# Patient Record
Sex: MALE | Race: WHITE | Employment: OTHER | URBAN - METROPOLITAN AREA
[De-identification: names, ages, dates, MRNs, and addresses within clinical notes are randomized per-mention and may not be internally consistent; named-entity substitution may affect disease eponyms.]

---

## 2017-08-17 PROBLEM — M51.36 DDD (DEGENERATIVE DISC DISEASE), LUMBAR: Status: ACTIVE | Noted: 2017-08-17

## 2017-08-17 PROBLEM — M48.04 THORACIC STENOSIS: Status: ACTIVE | Noted: 2017-08-17

## 2017-08-17 PROBLEM — M48.061 LUMBAR STENOSIS: Status: ACTIVE | Noted: 2017-08-17

## 2017-08-17 PROBLEM — M54.16 LUMBAR RADICULITIS: Status: ACTIVE | Noted: 2017-08-17

## 2017-08-28 ENCOUNTER — HOSPITAL ENCOUNTER (OUTPATIENT)
Dept: GENERAL RADIOLOGY | Facility: HOSPITAL | Age: 67
Discharge: HOME | End: 2017-08-28
Attending: NURSE PRACTITIONER
Payer: MEDICARE

## 2017-08-28 ENCOUNTER — OFFICE VISIT (OUTPATIENT)
Dept: SURGERY | Facility: CLINIC | Age: 67
End: 2017-08-28

## 2017-08-28 VITALS — SYSTOLIC BLOOD PRESSURE: 160 MMHG | HEART RATE: 64 BPM | DIASTOLIC BLOOD PRESSURE: 90 MMHG

## 2017-08-28 DIAGNOSIS — M51.36 DDD (DEGENERATIVE DISC DISEASE), LUMBAR: ICD-10-CM

## 2017-08-28 DIAGNOSIS — M54.16 BILATERAL LUMBAR RADICULOPATHY: ICD-10-CM

## 2017-08-28 DIAGNOSIS — M48.061 LUMBAR STENOSIS: ICD-10-CM

## 2017-08-28 DIAGNOSIS — M47.14 THORACIC SPONDYLOSIS WITH CORD COMPRESSION: ICD-10-CM

## 2017-08-28 DIAGNOSIS — M51.34 DDD (DEGENERATIVE DISC DISEASE), THORACIC: ICD-10-CM

## 2017-08-28 DIAGNOSIS — M47.14 THORACIC SPONDYLOSIS WITH CORD COMPRESSION: Primary | ICD-10-CM

## 2017-08-28 PROCEDURE — 99204 OFFICE O/P NEW MOD 45 MIN: CPT | Performed by: NURSE PRACTITIONER

## 2017-08-28 PROCEDURE — 72114 X-RAY EXAM L-S SPINE BENDING: CPT | Performed by: NURSE PRACTITIONER

## 2017-08-28 PROCEDURE — 72072 X-RAY EXAM THORAC SPINE 3VWS: CPT | Performed by: NURSE PRACTITIONER

## 2017-08-28 RX ORDER — GABAPENTIN 100 MG/1
300 CAPSULE ORAL NIGHTLY
Qty: 90 CAPSULE | Refills: 0 | Status: SHIPPED | OUTPATIENT
Start: 2017-08-28 | End: 2017-09-12

## 2017-08-28 NOTE — PATIENT INSTRUCTIONS
Refill policies:    • Allow 2-3 business days for refills; controlled substances may take longer.   • Contact your pharmacy at least 5 days prior to running out of medication and have them send an electronic request or submit request through the [facility] have a procedure or additional testing performed. [place] [facility]) will contact your insurance carrier to obtain pre-certification or prior authorization.     Unfortunately, [facility] has seen an increase in denial of payment even though the p

## 2017-08-28 NOTE — H&P
NEUROSURGERY CLINIC VISIT      Yayo Nair  2/22/1950      Patient presents with:   Other: NP referred by Dr. Bard Sheikh for lumbar stenosis        HPI:   Yayo Nair is a 69year old male PM Smokeless tobacco: Never Used                      Alcohol use: No              Denies history of substance abuse or addiction.   No Known Allergies      PHYSICAL EXAM:  /90 (BP Location: Left arm, Patient Position: Sitting, Cuff Size: adult)   Pulse MRI lumbar spine multiple level spinal canal stenosis with multilevel degenerative changes. L2-3 severe facet arthropathy with severe left neural foraminal narrowing and mild to moderate central canal stenosis.   L3-4 diffuse disc bulge with moderate bilat [facility]  [address], [address], [address] [facility]  [phone]  8/28/2017

## 2017-08-28 NOTE — PROGRESS NOTES
Location of Pain:lower back pain,  thighs, bottoms of feet    Date Pain Began: back pain for months, numbness and tingling in legs for 6 weeks          Work Related:   No        Receiving Work Comp/Disability:   No    Numeric Rating Scale:  Pain at Present

## 2017-08-31 ENCOUNTER — TELEPHONE (OUTPATIENT)
Dept: SURGERY | Facility: CLINIC | Age: 67
End: 2017-08-31

## 2017-08-31 NOTE — TELEPHONE ENCOUNTER
Pt presented to appt on 08/31 @ 2pm. Pt did not have referral for this appt, pt was aware referral required since 08/29 when [facility] contacted him while putting LISSET together.  Pt tried to work with referring office to have referral placed in time, however refer

## 2017-09-05 ENCOUNTER — TELEPHONE (OUTPATIENT)
Dept: SURGERY | Facility: CLINIC | Age: 67
End: 2017-09-05

## 2017-09-05 NOTE — TELEPHONE ENCOUNTER
Spoke with patient who stated he was not able to make f/u appointment that was previously scheduled with  8/31/17 due to insurance issues. Patient is now rescheduled for 10/3/17.  Patient is looking for direction on activities he should or should

## 2017-09-12 ENCOUNTER — OFFICE VISIT (OUTPATIENT)
Dept: SURGERY | Facility: CLINIC | Age: 67
End: 2017-09-12

## 2017-09-12 VITALS — SYSTOLIC BLOOD PRESSURE: 150 MMHG | HEART RATE: 80 BPM | DIASTOLIC BLOOD PRESSURE: 80 MMHG

## 2017-09-12 DIAGNOSIS — M48.04 THORACIC STENOSIS: Primary | ICD-10-CM

## 2017-09-12 PROCEDURE — 99215 OFFICE O/P EST HI 40 MIN: CPT | Performed by: NEUROLOGICAL SURGERY

## 2017-09-12 RX ORDER — GABAPENTIN 100 MG/1
300 CAPSULE ORAL NIGHTLY
Qty: 180 CAPSULE | Refills: 1 | Status: SHIPPED | OUTPATIENT
Start: 2017-09-12 | End: 2017-09-29

## 2017-09-12 NOTE — PATIENT INSTRUCTIONS
Refill policies:    • Allow 2-3 business days for refills; controlled substances may take longer.   • Contact your pharmacy at least 5 days prior to running out of medication and have them send an electronic request or submit request through the [facility] have a procedure or additional testing performed. [facility]) will contact your insurance carrier to obtain pre-certification or prior authorization.     Unfortunately, [facility] has seen an increase in denial of payment even though the p

## 2017-09-12 NOTE — PROGRESS NOTES
Neurosurgery Clinic Visit  2017    Jeanne Merino PCP:  Hiral Cabezas MD    1950 MRN [id number]     HISTORY OF PRESENT ILLNESS:  Jeanne Merino is a(n) 69year old male here for f/u  C/o christine ant thigh burning sensations  Some weakness  B spent with patient:  45 Minutes

## 2017-09-12 NOTE — PROGRESS NOTES
Pt is here to follow up with provider and discuss imaging    Numbness and tingling in legs bilateral, pain 2/10 in mid back    Taking 300mg of gabapentin nightly before bed

## 2017-09-29 DIAGNOSIS — M48.04 THORACIC STENOSIS: ICD-10-CM

## 2017-09-29 RX ORDER — GABAPENTIN 100 MG/1
CAPSULE ORAL
Qty: 180 CAPSULE | Refills: 1 | Status: SHIPPED | OUTPATIENT
Start: 2017-09-29 | End: 2017-09-29

## 2017-09-29 NOTE — TELEPHONE ENCOUNTER
Medication: Gabapentin    Date of last refill: 9/12/17  [place]    Last office visit: 9/12/17  Due back to clinic per last office note:  3 weeks  Date next office visit scheduled:  10/5/17    Last OV note recommendat

## 2017-10-02 RX ORDER — GABAPENTIN 100 MG/1
CAPSULE ORAL
Qty: 450 CAPSULE | Refills: 0 | Status: SHIPPED | OUTPATIENT
Start: 2017-10-02 | End: 2018-03-21

## 2017-10-02 NOTE — TELEPHONE ENCOUNTER
Patient is requesting a 90day supply.        Medication: Gabapentin 100mg     Date of last refill: 9/29/17  [place]     Last office visit: 9/12/17  Due back to clinic per last office note:  3 weeks  Date next office visit

## 2017-10-04 ENCOUNTER — OFFICE VISIT (OUTPATIENT)
Dept: ELECTROPHYSIOLOGY | Facility: HOSPITAL | Age: 67
End: 2017-10-04
Attending: NEUROLOGICAL SURGERY
Payer: MEDICARE

## 2017-10-04 DIAGNOSIS — M48.04 THORACIC STENOSIS: ICD-10-CM

## 2017-10-05 ENCOUNTER — TELEPHONE (OUTPATIENT)
Dept: SURGERY | Facility: CLINIC | Age: 67
End: 2017-10-05

## 2017-10-05 ENCOUNTER — OFFICE VISIT (OUTPATIENT)
Dept: SURGERY | Facility: CLINIC | Age: 67
End: 2017-10-05

## 2017-10-05 VITALS
HEART RATE: 64 BPM | BODY MASS INDEX: 28.7 KG/M2 | SYSTOLIC BLOOD PRESSURE: 142 MMHG | RESPIRATION RATE: 16 BRPM | HEIGHT: 71 IN | DIASTOLIC BLOOD PRESSURE: 78 MMHG | WEIGHT: 205 LBS

## 2017-10-05 DIAGNOSIS — M54.16 LUMBAR RADICULITIS: Primary | ICD-10-CM

## 2017-10-05 PROCEDURE — 99213 OFFICE O/P EST LOW 20 MIN: CPT | Performed by: NEUROLOGICAL SURGERY

## 2017-10-05 NOTE — PATIENT INSTRUCTIONS
Refill policies:    • Allow 2-3 business days for refills; controlled substances may take longer.   • Contact your pharmacy at least 5 days prior to running out of medication and have them send an electronic request or submit request through the [facility] have a procedure or additional testing performed. [facility]) will contact your insurance carrier to obtain pre-certification or prior authorization.     Unfortunately, [facility] has seen an increase in denial of payment even though the p

## 2017-10-05 NOTE — PROCEDURES
[address]      PATIENT'S NAME: Toni Maycol   REFERRING PHYSICIAN: Dionte Batista M.D.    PATIENT ACCOUNT #: [de-identified] LOCATION: [facility]   MEDICAL RECORD #: [id number] DATE OF BIRTH: abnormal study.   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

## 2017-10-05 NOTE — PROGRESS NOTES
Neurosurgery Clinic Visit  10/5/2017    Floridalma Herrera PCP:  Abel Prieto MD    1950 MRN [id number]     HISTORY OF PRESENT ILLNESS:  Floridalma Herrera is a(n) 69year old male here for follow-up  Patient got his EMG  Patient has family discusse

## 2017-11-29 ENCOUNTER — TELEPHONE (OUTPATIENT)
Dept: SURGERY | Facility: CLINIC | Age: 67
End: 2017-11-29

## 2017-11-29 NOTE — TELEPHONE ENCOUNTER
Patient is scheduled for surgical discussion appointment on 12/7/17 with . Surgery will be discussed at that time and patient will scheduled after that visit.

## 2017-12-07 ENCOUNTER — TELEPHONE (OUTPATIENT)
Dept: SURGERY | Facility: CLINIC | Age: 67
End: 2017-12-07

## 2017-12-07 ENCOUNTER — OFFICE VISIT (OUTPATIENT)
Dept: SURGERY | Facility: CLINIC | Age: 67
End: 2017-12-07

## 2017-12-07 VITALS — HEART RATE: 78 BPM | SYSTOLIC BLOOD PRESSURE: 132 MMHG | DIASTOLIC BLOOD PRESSURE: 76 MMHG

## 2017-12-07 DIAGNOSIS — M48.04 THORACIC STENOSIS: Primary | ICD-10-CM

## 2017-12-07 DIAGNOSIS — M54.16 LUMBAR RADICULITIS: ICD-10-CM

## 2017-12-07 DIAGNOSIS — M51.36 DDD (DEGENERATIVE DISC DISEASE), LUMBAR: ICD-10-CM

## 2017-12-07 DIAGNOSIS — M47.14 THORACIC SPONDYLOSIS WITH CORD COMPRESSION: ICD-10-CM

## 2017-12-07 PROCEDURE — 99213 OFFICE O/P EST LOW 20 MIN: CPT | Performed by: NEUROLOGICAL SURGERY

## 2017-12-07 NOTE — PATIENT INSTRUCTIONS
Refill policies:    • Allow 2-3 business days for refills; controlled substances may take longer.   • Contact your pharmacy at least 5 days prior to running out of medication and have them send an electronic request or submit request through the [facility] have a procedure or additional testing performed. [facility]) will contact your insurance carrier to obtain pre-certification or prior authorization.     Unfortunately, [facility] has seen an increase in denial of payment even though the p

## 2017-12-07 NOTE — H&P
Neurosurgery Clinic Visit  2017    Yayo Korey PCP:  Nikita Gastelum MD    1950 MRN [id number]     HISTORY OF PRESENT ILLNESS:  Yayo Nair is a(n) 69year old male who is here for follow-up of thoracic stenosis.   He continues to [facility]

## 2017-12-07 NOTE — PROGRESS NOTES
Here to schedule, pain has been worse.   C/o low back pain with nerve pain in anterior thighs to bottoms of fee

## 2017-12-08 NOTE — TELEPHONE ENCOUNTER
Patient scheduled for  THORACIC 10 AND THORACIC 11 LAMINECTOMIES  on 1/3/2018 with     Pre-op instructions discussed with patient and surgical packet provided:    · You will need to contact the Pre-admission department at [phone] to schedul

## 2017-12-11 RX ORDER — MULTIVIT-MIN/IRON FUM/FOLIC AC 7.5 MG-4
1 TABLET ORAL DAILY
COMMUNITY

## 2017-12-17 DIAGNOSIS — M48.04 THORACIC STENOSIS: ICD-10-CM

## 2017-12-18 RX ORDER — GABAPENTIN 100 MG/1
CAPSULE ORAL
Qty: 180 CAPSULE | Refills: 0 | OUTPATIENT
Start: 2017-12-18

## 2017-12-19 ENCOUNTER — TELEPHONE (OUTPATIENT)
Dept: SURGERY | Facility: CLINIC | Age: 67
End: 2017-12-19

## 2017-12-21 PROCEDURE — 87081 CULTURE SCREEN ONLY: CPT | Performed by: INTERNAL MEDICINE

## 2018-01-02 ENCOUNTER — ANESTHESIA EVENT (OUTPATIENT)
Dept: SURGERY | Facility: HOSPITAL | Age: 68
DRG: 516 | End: 2018-01-02
Payer: MEDICARE

## 2018-01-03 ENCOUNTER — APPOINTMENT (OUTPATIENT)
Dept: GENERAL RADIOLOGY | Facility: HOSPITAL | Age: 68
DRG: 516 | End: 2018-01-03
Attending: NEUROLOGICAL SURGERY
Payer: MEDICARE

## 2018-01-03 ENCOUNTER — HOSPITAL ENCOUNTER (INPATIENT)
Facility: HOSPITAL | Age: 68
LOS: 1 days | Discharge: HOME | DRG: 516 | End: 2018-01-04
Attending: NEUROLOGICAL SURGERY | Admitting: NEUROLOGICAL SURGERY
Payer: MEDICARE

## 2018-01-03 ENCOUNTER — ANESTHESIA (OUTPATIENT)
Dept: SURGERY | Facility: HOSPITAL | Age: 68
DRG: 516 | End: 2018-01-03
Payer: MEDICARE

## 2018-01-03 ENCOUNTER — SURGERY (OUTPATIENT)
Age: 68
End: 2018-01-03

## 2018-01-03 DIAGNOSIS — M47.14 THORACIC SPONDYLOSIS WITH CORD COMPRESSION: ICD-10-CM

## 2018-01-03 DIAGNOSIS — M48.04 THORACIC STENOSIS: Primary | ICD-10-CM

## 2018-01-03 LAB
ERYTHROCYTE [DISTWIDTH] IN BLOOD BY AUTOMATED COUNT: 12.9 % (ref 11.5–16)
HBV SURFACE AG SERPL QL IA: NONREACTIVE
HCT VFR BLD AUTO: 41.4 % (ref 37–53)
HEPATITIS B SURFACE ANTIGEN INDEX: <0.1
HEPATITIS C VIRUS AB INTERPRETATION: NONREACTIVE
HGB BLD-MCNC: 14.1 G/DL (ref 13–17)
HIVS EXPOSURE ONLY: NONREACTIVE
MCH RBC QN AUTO: 32.1 PG (ref 27–33.2)
MCHC RBC AUTO-ENTMCNC: 34.1 G/DL (ref 31–37)
MCV RBC AUTO: 94.3 FL (ref 80–99)
PLATELET # BLD AUTO: 241 10(3)UL (ref 150–450)
RBC # BLD AUTO: 4.39 X10(6)UL (ref 3.8–5.8)
RED CELL DISTRIBUTION WIDTH-SD: 45.1 FL (ref 35.1–46.3)
WBC # BLD AUTO: 9.7 X10(3) UL (ref 4–13)

## 2018-01-03 PROCEDURE — 86703 HIV-1/HIV-2 1 RESULT ANTBDY: CPT | Performed by: NEUROLOGICAL SURGERY

## 2018-01-03 PROCEDURE — 85027 COMPLETE CBC AUTOMATED: CPT | Performed by: PHYSICIAN ASSISTANT

## 2018-01-03 PROCEDURE — 87340 HEPATITIS B SURFACE AG IA: CPT | Performed by: NEUROLOGICAL SURGERY

## 2018-01-03 PROCEDURE — 01N80ZZ RELEASE THORACIC NERVE, OPEN APPROACH: ICD-10-PCS | Performed by: NEUROLOGICAL SURGERY

## 2018-01-03 PROCEDURE — 86803 HEPATITIS C AB TEST: CPT | Performed by: NEUROLOGICAL SURGERY

## 2018-01-03 PROCEDURE — 76001 XR FLUOROSCOPE EXAM >1 HR EXTENSIVE (CPT=76001): CPT | Performed by: NEUROLOGICAL SURGERY

## 2018-01-03 RX ORDER — MIDAZOLAM HYDROCHLORIDE 1 MG/ML
1 INJECTION INTRAMUSCULAR; INTRAVENOUS EVERY 5 MIN PRN
Status: DISCONTINUED | OUTPATIENT
Start: 2018-01-03 | End: 2018-01-03 | Stop reason: HOSPADM

## 2018-01-03 RX ORDER — GABAPENTIN 100 MG/1
100 CAPSULE ORAL 3 TIMES DAILY
Status: DISCONTINUED | OUTPATIENT
Start: 2018-01-03 | End: 2018-01-04

## 2018-01-03 RX ORDER — MEPERIDINE HYDROCHLORIDE 25 MG/ML
12.5 INJECTION INTRAMUSCULAR; INTRAVENOUS; SUBCUTANEOUS AS NEEDED
Status: DISCONTINUED | OUTPATIENT
Start: 2018-01-03 | End: 2018-01-03 | Stop reason: HOSPADM

## 2018-01-03 RX ORDER — ONDANSETRON 2 MG/ML
4 INJECTION INTRAMUSCULAR; INTRAVENOUS EVERY 4 HOURS PRN
Status: ACTIVE | OUTPATIENT
Start: 2018-01-03 | End: 2018-01-04

## 2018-01-03 RX ORDER — POLYETHYLENE GLYCOL 3350 17 G/17G
17 POWDER, FOR SOLUTION ORAL DAILY PRN
Status: DISCONTINUED | OUTPATIENT
Start: 2018-01-03 | End: 2018-01-04

## 2018-01-03 RX ORDER — CEFAZOLIN SODIUM 1 G/3ML
INJECTION, POWDER, FOR SOLUTION INTRAMUSCULAR; INTRAVENOUS
Status: DISCONTINUED | OUTPATIENT
Start: 2018-01-03 | End: 2018-01-03 | Stop reason: HOSPADM

## 2018-01-03 RX ORDER — BISACODYL 10 MG
10 SUPPOSITORY, RECTAL RECTAL
Status: DISCONTINUED | OUTPATIENT
Start: 2018-01-03 | End: 2018-01-04

## 2018-01-03 RX ORDER — HYDROCODONE BITARTRATE AND ACETAMINOPHEN 5; 325 MG/1; MG/1
2 TABLET ORAL AS NEEDED
Status: DISCONTINUED | OUTPATIENT
Start: 2018-01-03 | End: 2018-01-03 | Stop reason: HOSPADM

## 2018-01-03 RX ORDER — HYDROMORPHONE HYDROCHLORIDE 1 MG/ML
0.4 INJECTION, SOLUTION INTRAMUSCULAR; INTRAVENOUS; SUBCUTANEOUS EVERY 5 MIN PRN
Status: DISCONTINUED | OUTPATIENT
Start: 2018-01-03 | End: 2018-01-03 | Stop reason: HOSPADM

## 2018-01-03 RX ORDER — HYDROMORPHONE HYDROCHLORIDE 1 MG/ML
INJECTION, SOLUTION INTRAMUSCULAR; INTRAVENOUS; SUBCUTANEOUS
Status: COMPLETED
Start: 2018-01-03 | End: 2018-01-03

## 2018-01-03 RX ORDER — DOCUSATE SODIUM 100 MG/1
100 CAPSULE, LIQUID FILLED ORAL 2 TIMES DAILY
Status: DISCONTINUED | OUTPATIENT
Start: 2018-01-03 | End: 2018-01-04

## 2018-01-03 RX ORDER — CEFAZOLIN SODIUM/WATER 2 G/20 ML
2 SYRINGE (ML) INTRAVENOUS EVERY 8 HOURS
Status: COMPLETED | OUTPATIENT
Start: 2018-01-03 | End: 2018-01-03

## 2018-01-03 RX ORDER — CYCLOBENZAPRINE HCL 10 MG
10 TABLET ORAL EVERY 8 HOURS PRN
Status: DISCONTINUED | OUTPATIENT
Start: 2018-01-03 | End: 2018-01-04

## 2018-01-03 RX ORDER — BUPIVACAINE HYDROCHLORIDE AND EPINEPHRINE 2.5; 5 MG/ML; UG/ML
INJECTION, SOLUTION EPIDURAL; INFILTRATION; INTRACAUDAL; PERINEURAL AS NEEDED
Status: DISCONTINUED | OUTPATIENT
Start: 2018-01-03 | End: 2018-01-03 | Stop reason: HOSPADM

## 2018-01-03 RX ORDER — CEFAZOLIN SODIUM/WATER 2 G/20 ML
SYRINGE (ML) INTRAVENOUS
Status: DISPENSED
Start: 2018-01-03 | End: 2018-01-03

## 2018-01-03 RX ORDER — HYDROCODONE BITARTRATE AND ACETAMINOPHEN 10; 325 MG/1; MG/1
1 TABLET ORAL EVERY 4 HOURS PRN
Status: DISCONTINUED | OUTPATIENT
Start: 2018-01-03 | End: 2018-01-04

## 2018-01-03 RX ORDER — BACITRACIN 50000 [USP'U]/1
INJECTION, POWDER, LYOPHILIZED, FOR SOLUTION INTRAMUSCULAR AS NEEDED
Status: DISCONTINUED | OUTPATIENT
Start: 2018-01-03 | End: 2018-01-03 | Stop reason: HOSPADM

## 2018-01-03 RX ORDER — HYDROCODONE BITARTRATE AND ACETAMINOPHEN 10; 325 MG/1; MG/1
2 TABLET ORAL EVERY 4 HOURS PRN
Status: DISCONTINUED | OUTPATIENT
Start: 2018-01-03 | End: 2018-01-04

## 2018-01-03 RX ORDER — SODIUM CHLORIDE AND POTASSIUM CHLORIDE .9; .15 G/100ML; G/100ML
SOLUTION INTRAVENOUS CONTINUOUS
Status: DISCONTINUED | OUTPATIENT
Start: 2018-01-03 | End: 2018-01-04

## 2018-01-03 RX ORDER — DIPHENHYDRAMINE HYDROCHLORIDE 50 MG/ML
25 INJECTION INTRAMUSCULAR; INTRAVENOUS EVERY 4 HOURS PRN
Status: DISCONTINUED | OUTPATIENT
Start: 2018-01-03 | End: 2018-01-04

## 2018-01-03 RX ORDER — ONDANSETRON 2 MG/ML
4 INJECTION INTRAMUSCULAR; INTRAVENOUS AS NEEDED
Status: DISCONTINUED | OUTPATIENT
Start: 2018-01-03 | End: 2018-01-03 | Stop reason: HOSPADM

## 2018-01-03 RX ORDER — HYDROMORPHONE HYDROCHLORIDE 1 MG/ML
0.8 INJECTION, SOLUTION INTRAMUSCULAR; INTRAVENOUS; SUBCUTANEOUS EVERY 2 HOUR PRN
Status: DISCONTINUED | OUTPATIENT
Start: 2018-01-03 | End: 2018-01-04

## 2018-01-03 RX ORDER — LISINOPRIL 5 MG/1
5 TABLET ORAL DAILY
Status: DISCONTINUED | OUTPATIENT
Start: 2018-01-03 | End: 2018-01-04

## 2018-01-03 RX ORDER — SODIUM PHOSPHATE, DIBASIC AND SODIUM PHOSPHATE, MONOBASIC 7; 19 G/133ML; G/133ML
1 ENEMA RECTAL ONCE AS NEEDED
Status: DISCONTINUED | OUTPATIENT
Start: 2018-01-03 | End: 2018-01-04

## 2018-01-03 RX ORDER — NALOXONE HYDROCHLORIDE 0.4 MG/ML
80 INJECTION, SOLUTION INTRAMUSCULAR; INTRAVENOUS; SUBCUTANEOUS AS NEEDED
Status: DISCONTINUED | OUTPATIENT
Start: 2018-01-03 | End: 2018-01-03 | Stop reason: HOSPADM

## 2018-01-03 RX ORDER — SODIUM CHLORIDE, SODIUM LACTATE, POTASSIUM CHLORIDE, CALCIUM CHLORIDE 600; 310; 30; 20 MG/100ML; MG/100ML; MG/100ML; MG/100ML
INJECTION, SOLUTION INTRAVENOUS CONTINUOUS
Status: DISCONTINUED | OUTPATIENT
Start: 2018-01-03 | End: 2018-01-03 | Stop reason: HOSPADM

## 2018-01-03 RX ORDER — SODIUM CHLORIDE, SODIUM LACTATE, POTASSIUM CHLORIDE, CALCIUM CHLORIDE 600; 310; 30; 20 MG/100ML; MG/100ML; MG/100ML; MG/100ML
INJECTION, SOLUTION INTRAVENOUS CONTINUOUS
Status: DISCONTINUED | OUTPATIENT
Start: 2018-01-03 | End: 2018-01-03

## 2018-01-03 RX ORDER — ACETAMINOPHEN 325 MG/1
650 TABLET ORAL EVERY 4 HOURS PRN
Status: DISCONTINUED | OUTPATIENT
Start: 2018-01-03 | End: 2018-01-04

## 2018-01-03 RX ORDER — HYDROMORPHONE HYDROCHLORIDE 1 MG/ML
0.4 INJECTION, SOLUTION INTRAMUSCULAR; INTRAVENOUS; SUBCUTANEOUS EVERY 2 HOUR PRN
Status: DISCONTINUED | OUTPATIENT
Start: 2018-01-03 | End: 2018-01-04

## 2018-01-03 RX ORDER — MULTIPLE VITAMINS W/ MINERALS TAB 9MG-400MCG
1 TAB ORAL DAILY
Status: DISCONTINUED | OUTPATIENT
Start: 2018-01-03 | End: 2018-01-04

## 2018-01-03 RX ORDER — HYDROMORPHONE HYDROCHLORIDE 1 MG/ML
0.2 INJECTION, SOLUTION INTRAMUSCULAR; INTRAVENOUS; SUBCUTANEOUS EVERY 2 HOUR PRN
Status: DISCONTINUED | OUTPATIENT
Start: 2018-01-03 | End: 2018-01-04

## 2018-01-03 RX ORDER — CEFAZOLIN SODIUM/WATER 2 G/20 ML
2 SYRINGE (ML) INTRAVENOUS ONCE
Status: DISCONTINUED | OUTPATIENT
Start: 2018-01-03 | End: 2018-01-04

## 2018-01-03 RX ORDER — DIPHENHYDRAMINE HCL 25 MG
25 CAPSULE ORAL EVERY 4 HOURS PRN
Status: DISCONTINUED | OUTPATIENT
Start: 2018-01-03 | End: 2018-01-04

## 2018-01-03 RX ORDER — HYDROCODONE BITARTRATE AND ACETAMINOPHEN 5; 325 MG/1; MG/1
1 TABLET ORAL AS NEEDED
Status: DISCONTINUED | OUTPATIENT
Start: 2018-01-03 | End: 2018-01-03 | Stop reason: HOSPADM

## 2018-01-03 NOTE — ANESTHESIA PREPROCEDURE EVALUATION
PRE-OP EVALUATION    Patient Name: Franco Pemberton    Pre-op Diagnosis: Thoracic stenosis [M48.04]    Procedure(s):  THORACIC 10 AND THORACIC 11 LAMINECTOMIES AND ALL INDICATED PROCEDURES    Surgeon(s) and Role:     * Yury Price MD - Primary    P status: Never Smoker    Smokeless tobacco: Never Used    Alcohol use No       Drug use: No     Available pre-op labs reviewed.     Lab Results  Component Value Date   WBC 7.52 12/26/2017   RBC 4.89 12/26/2017   HGB 15.7 12/26/2017   HCT 45.7 12/26/2017   [facility]

## 2018-01-03 NOTE — BRIEF OP NOTE
Pre-Operative Diagnosis: Thoracic stenosis [M48.04]     Post-Operative Diagnosis: Thoracic stenosis [M48.04]     Procedure Performed:   Procedure(s):  THORACIC 10 AND THORACIC 11 LAMINECTOMIES     Surgeon(s) and Role:     * Sathish Price MD - [facility]

## 2018-01-03 NOTE — H&P (VIEW-ONLY)
Neurosurgery Clinic Visit  2017    Santino Melo PCP:  Skylar Doherty MD    1950 MRN [id number]     HISTORY OF PRESENT ILLNESS:  Santino Melo is a(n) 69year old male who is here for follow-up of thoracic stenosis.   He continues to [facility]

## 2018-01-03 NOTE — ANESTHESIA POSTPROCEDURE EVALUATION
[address] Patient Status:  Surgery Admit   Age/Gender 69year old male MRN [id number]   [facility] SURGERY Attending Mook Newman MD   Hosp Day # 0 PCP Bigg Sotvall MD       Anesthesia Post-op Note    Proc

## 2018-01-03 NOTE — OPERATIVE REPORT
Operative Note    Patient Name: Funmilayo Mendez    Preoperative Diagnosis: Thoracic stenosis [M48.04]    Postoperative Diagnosis: same    Primary Surgeon: Amilcar Taylor    Assistant: Abbie Luna pa-c    Procedures: thoracic 10 and thoracic 11 vannesa

## 2018-01-04 VITALS
OXYGEN SATURATION: 98 % | TEMPERATURE: 98 F | WEIGHT: 215.38 LBS | DIASTOLIC BLOOD PRESSURE: 73 MMHG | RESPIRATION RATE: 18 BRPM | SYSTOLIC BLOOD PRESSURE: 152 MMHG | BODY MASS INDEX: 30.83 KG/M2 | HEART RATE: 62 BPM | HEIGHT: 70 IN

## 2018-01-04 LAB
ERYTHROCYTE [DISTWIDTH] IN BLOOD BY AUTOMATED COUNT: 13.2 % (ref 11.5–16)
HCT VFR BLD AUTO: 40.9 % (ref 37–53)
HGB BLD-MCNC: 13.6 G/DL (ref 13–17)
MCH RBC QN AUTO: 32.1 PG (ref 27–33.2)
MCHC RBC AUTO-ENTMCNC: 33.3 G/DL (ref 31–37)
MCV RBC AUTO: 96.5 FL (ref 80–99)
PLATELET # BLD AUTO: 254 10(3)UL (ref 150–450)
RBC # BLD AUTO: 4.24 X10(6)UL (ref 3.8–5.8)
RED CELL DISTRIBUTION WIDTH-SD: 46.9 FL (ref 35.1–46.3)
WBC # BLD AUTO: 13.3 X10(3) UL (ref 4–13)

## 2018-01-04 PROCEDURE — 97165 OT EVAL LOW COMPLEX 30 MIN: CPT

## 2018-01-04 PROCEDURE — 97161 PT EVAL LOW COMPLEX 20 MIN: CPT

## 2018-01-04 PROCEDURE — 97535 SELF CARE MNGMENT TRAINING: CPT

## 2018-01-04 PROCEDURE — 97116 GAIT TRAINING THERAPY: CPT

## 2018-01-04 PROCEDURE — 85027 COMPLETE CBC AUTOMATED: CPT | Performed by: PHYSICIAN ASSISTANT

## 2018-01-04 RX ORDER — HYDROCODONE BITARTRATE AND ACETAMINOPHEN 10; 325 MG/1; MG/1
1-2 TABLET ORAL EVERY 6 HOURS PRN
Qty: 60 TABLET | Refills: 0 | Status: SHIPPED | OUTPATIENT
Start: 2018-01-04 | End: 2018-01-11 | Stop reason: ALTCHOICE

## 2018-01-04 NOTE — PLAN OF CARE
PAIN - ADULT    • Verbalizes/displays adequate comfort level or patient's stated pain goal Progressing        Patient/Family Goals    • Patient/Family Short Term Goal Progressing        SAFETY ADULT - FALL    • Free from fall injury Progressing        Kiana

## 2018-01-04 NOTE — OCCUPATIONAL THERAPY NOTE
OCCUPATIONAL THERAPY QUICK EVALUATION - INPATIENT    Room Number: 594/888-S  Evaluation Date: 1/4/2018     Type of Evaluation: Quick Eval  Presenting Problem: s/p T10-11 lamis 1/3/18    Physician Order: IP Consult to Occupational Therapy  Reason for [place] [employer]  Fall Risk: Standard fall risk    WEIGHT BEARING RESTRICTION  Weight Bearing Restriction: None                PAIN ASSESSMENT  Rating: 3  Location: back  Management Techniques: Activity promotion; Body mechanics;Breathing techniques;Relaxation;Reposit has counter in same location at home). Patient required minimal cueing for hand placement during transfers throughout.  Patient also educated on OT role, safety, fall prevention, pain management, shower transfers (simulated Mod I), car transfers (simulated independently  Patient able to dress lower extremities: safely and independently  Patient/Caregiver able to demonstrate safety with ADLS: safely and independently

## 2018-01-04 NOTE — PHYSICAL THERAPY NOTE
PHYSICAL THERAPY QUICK EVALUATION - INPATIENT    Room Number: 338/045-Q  Evaluation Date: 1/4/2018  Presenting Problem: s/p T10/11 laminectomy 1/3/18  Physician Order: PT Eval and Treat    Problem List  Active Problems:    * No active hospital problems. chair with arms (e.g., wheelchair, bedside commode, etc.): None   -   Moving from lying on back to sitting on the side of the bed?: None   How much help from another person does the patient currently need. ..   -   Moving to and from a bed to a chair (inclu patient's clinical presentation is stable and overall evaluation complexity is considered low. PT Discharge Recommendations: Home    PLAN  Patient has been evaluated and presents with no skilled Physical Therapy needs at this time.   Patient discharged fro

## 2018-01-04 NOTE — PROGRESS NOTES
[facility] Neurosurgery Progress Note    Shannon Brooks Patient Status:  Inpatient    1950 MRN [id number]   [facility] 3SW-A Attending Lindsey Anne MD   Hosp Day # 1 PCP Jaimee Mendoza MD     Subjective:  Sommer Shannon

## 2018-01-16 ENCOUNTER — OFFICE VISIT (OUTPATIENT)
Dept: SURGERY | Facility: CLINIC | Age: 68
End: 2018-01-16

## 2018-01-16 VITALS — DIASTOLIC BLOOD PRESSURE: 82 MMHG | RESPIRATION RATE: 18 BRPM | HEART RATE: 64 BPM | SYSTOLIC BLOOD PRESSURE: 146 MMHG

## 2018-01-16 DIAGNOSIS — M47.14 THORACIC SPONDYLOSIS WITH CORD COMPRESSION: Primary | ICD-10-CM

## 2018-01-16 PROCEDURE — 99024 POSTOP FOLLOW-UP VISIT: CPT | Performed by: NEUROLOGICAL SURGERY

## 2018-01-16 RX ORDER — DICLOFENAC SODIUM 75 MG/1
75 TABLET, DELAYED RELEASE ORAL 2 TIMES DAILY
Status: ON HOLD | COMMUNITY
End: 2018-03-29

## 2018-01-16 NOTE — PROGRESS NOTES
Neurosurgery Clinic Visit  2018    [place] PCP:  Tino Walker MD    1950 MRN [id number]     HISTORY OF PRESENT ILLNESS:  Lamont Prescott is a(n) 69year old male s/p thoracic laminectomy  Doing well  Improved function  Legs feel b

## 2018-01-16 NOTE — PROGRESS NOTES
Patient f/u post-op. Surgery 1/3/18. Doing well still having numbness to both thighs and bottom of feet. Walking is much better.

## 2018-01-16 NOTE — PATIENT INSTRUCTIONS
Refill policies:    • Allow 2-3 business days for refills; controlled substances may take longer.   • Contact your pharmacy at least 5 days prior to running out of medication and have them send an electronic request or submit request through the [facility] recommended that you have a procedure or additional testing performed. [facility]) will contact your insurance carrier to obtain pre-certification or prior authorization.     Unfortunately, [facility] has seen an increase in denial of paym

## 2018-01-18 PROBLEM — M54.16 LUMBAR RADICULITIS: Status: RESOLVED | Noted: 2017-08-17 | Resolved: 2018-01-18

## 2018-01-18 PROBLEM — M48.04 THORACIC STENOSIS: Status: RESOLVED | Noted: 2017-08-17 | Resolved: 2018-01-18

## 2018-01-18 PROBLEM — M48.061 LUMBAR STENOSIS: Status: RESOLVED | Noted: 2017-08-17 | Resolved: 2018-01-18

## 2018-01-18 PROBLEM — Z98.890 S/P LAMINECTOMY: Status: ACTIVE | Noted: 2018-01-18

## 2018-01-18 PROBLEM — M51.36 DDD (DEGENERATIVE DISC DISEASE), LUMBAR: Status: RESOLVED | Noted: 2017-08-17 | Resolved: 2018-01-18

## 2018-02-20 ENCOUNTER — OFFICE VISIT (OUTPATIENT)
Dept: SURGERY | Facility: CLINIC | Age: 68
End: 2018-02-20

## 2018-02-20 VITALS — HEART RATE: 60 BPM | SYSTOLIC BLOOD PRESSURE: 140 MMHG | DIASTOLIC BLOOD PRESSURE: 80 MMHG

## 2018-02-20 DIAGNOSIS — M47.14 THORACIC SPONDYLOSIS WITH CORD COMPRESSION: Primary | ICD-10-CM

## 2018-02-20 DIAGNOSIS — M47.14 THORACIC MYELOPATHY: ICD-10-CM

## 2018-02-20 PROCEDURE — 99024 POSTOP FOLLOW-UP VISIT: CPT | Performed by: NEUROLOGICAL SURGERY

## 2018-02-20 NOTE — PROGRESS NOTES
Neurosurgery Clinic Visit  2018    Reji Webergerardo PCP:  Mireya Pate MD    1950 MRN [id number]     HISTORY OF PRESENT ILLNESS:  Reji Winslow is a(n) 69year old male here for follow-up status post thoracic laminectomy  His legs feel m

## 2018-02-20 NOTE — PATIENT INSTRUCTIONS
Refill policies:    • Allow 2-3 business days for refills; controlled substances may take longer.   • Contact your pharmacy at least 5 days prior to running out of medication and have them send an electronic request or submit request through the [facility] recommended that you have a procedure or additional testing performed. [facility]) will contact your insurance carrier to obtain pre-certification or prior authorization.     Unfortunately, [facility] has seen an increase in denial of paym

## 2018-03-19 ENCOUNTER — LABORATORY ENCOUNTER (OUTPATIENT)
Dept: LAB | Facility: HOSPITAL | Age: 68
End: 2018-03-19
Attending: ORTHOPAEDIC SURGERY
Payer: MEDICARE

## 2018-03-19 ENCOUNTER — HOSPITAL ENCOUNTER (OUTPATIENT)
Dept: PHYSICAL THERAPY | Facility: HOSPITAL | Age: 68
Discharge: HOME | End: 2018-03-19
Attending: ORTHOPAEDIC SURGERY
Payer: MEDICARE

## 2018-03-19 DIAGNOSIS — M17.12 PRIMARY OSTEOARTHRITIS OF LEFT KNEE: ICD-10-CM

## 2018-03-19 DIAGNOSIS — Z12.5 SCREENING FOR MALIGNANT NEOPLASM OF PROSTATE: ICD-10-CM

## 2018-03-19 DIAGNOSIS — I10 ESSENTIAL HYPERTENSION: ICD-10-CM

## 2018-03-19 DIAGNOSIS — Z01.818 PREOP EXAMINATION: ICD-10-CM

## 2018-03-19 LAB
ALBUMIN SERPL-MCNC: 3.9 G/DL (ref 3.5–4.8)
ALP LIVER SERPL-CCNC: 94 U/L (ref 45–117)
ALT SERPL-CCNC: 25 U/L (ref 17–63)
ANTIBODY SCREEN: NEGATIVE
AST SERPL-CCNC: 22 U/L (ref 15–41)
BASOPHILS # BLD AUTO: 0.03 X10(3) UL (ref 0–0.1)
BASOPHILS NFR BLD AUTO: 0.5 %
BILIRUB SERPL-MCNC: 0.5 MG/DL (ref 0.1–2)
BUN BLD-MCNC: 26 MG/DL (ref 8–20)
CALCIUM BLD-MCNC: 9 MG/DL (ref 8.3–10.3)
CHLORIDE: 106 MMOL/L (ref 101–111)
CHOLEST SMN-MCNC: 191 MG/DL (ref ?–200)
CO2: 25 MMOL/L (ref 22–32)
CREAT BLD-MCNC: 1.31 MG/DL (ref 0.7–1.3)
EOSINOPHIL # BLD AUTO: 0.19 X10(3) UL (ref 0–0.3)
EOSINOPHIL NFR BLD AUTO: 3.3 %
ERYTHROCYTE [DISTWIDTH] IN BLOOD BY AUTOMATED COUNT: 13.1 % (ref 11.5–16)
GLUCOSE BLD-MCNC: 81 MG/DL (ref 70–99)
HCT VFR BLD AUTO: 40.6 % (ref 37–53)
HDLC SERPL-MCNC: 26 MG/DL (ref 45–?)
HDLC SERPL: 7.35 {RATIO} (ref ?–4.97)
HGB BLD-MCNC: 13.9 G/DL (ref 13–17)
IMMATURE GRANULOCYTE COUNT: 0.01 X10(3) UL (ref 0–1)
IMMATURE GRANULOCYTE RATIO %: 0.2 %
LDLC SERPL CALC-MCNC: 123 MG/DL (ref ?–130)
LYMPHOCYTES # BLD AUTO: 2.34 X10(3) UL (ref 0.9–4)
LYMPHOCYTES NFR BLD AUTO: 41.1 %
M PROTEIN MFR SERPL ELPH: 7.2 G/DL (ref 6.1–8.3)
MCH RBC QN AUTO: 32.1 PG (ref 27–33.2)
MCHC RBC AUTO-ENTMCNC: 34.2 G/DL (ref 31–37)
MCV RBC AUTO: 93.8 FL (ref 80–99)
MONOCYTES # BLD AUTO: 0.35 X10(3) UL (ref 0.1–1)
MONOCYTES NFR BLD AUTO: 6.2 %
NEUTROPHIL ABS PRELIM: 2.77 X10 (3) UL (ref 1.3–6.7)
NEUTROPHILS # BLD AUTO: 2.77 X10(3) UL (ref 1.3–6.7)
NEUTROPHILS NFR BLD AUTO: 48.7 %
NONHDLC SERPL-MCNC: 165 MG/DL (ref ?–130)
PLATELET # BLD AUTO: 198 10(3)UL (ref 150–450)
POTASSIUM SERPL-SCNC: 4 MMOL/L (ref 3.6–5.1)
PSA SERPL-MCNC: 2.03 NG/ML (ref 0.01–4)
RBC # BLD AUTO: 4.33 X10(6)UL (ref 3.8–5.8)
RED CELL DISTRIBUTION WIDTH-SD: 45 FL (ref 35.1–46.3)
RH BLOOD TYPE: POSITIVE
SODIUM SERPL-SCNC: 139 MMOL/L (ref 136–144)
TRIGL SERPL-MCNC: 210 MG/DL (ref ?–150)
VLDLC SERPL CALC-MCNC: 42 MG/DL (ref 5–40)
WBC # BLD AUTO: 5.7 X10(3) UL (ref 4–13)

## 2018-03-19 PROCEDURE — 84153 ASSAY OF PSA TOTAL: CPT

## 2018-03-19 PROCEDURE — 36415 COLL VENOUS BLD VENIPUNCTURE: CPT

## 2018-03-19 PROCEDURE — 86901 BLOOD TYPING SEROLOGIC RH(D): CPT

## 2018-03-19 PROCEDURE — 85025 COMPLETE CBC W/AUTO DIFF WBC: CPT

## 2018-03-19 PROCEDURE — 80061 LIPID PANEL: CPT

## 2018-03-19 PROCEDURE — 87081 CULTURE SCREEN ONLY: CPT

## 2018-03-19 PROCEDURE — 80053 COMPREHEN METABOLIC PANEL: CPT

## 2018-03-19 PROCEDURE — 86900 BLOOD TYPING SEROLOGIC ABO: CPT

## 2018-03-19 PROCEDURE — 86850 RBC ANTIBODY SCREEN: CPT

## 2018-03-21 DIAGNOSIS — M48.04 THORACIC STENOSIS: ICD-10-CM

## 2018-03-21 PROBLEM — Z96.652 STATUS POST LEFT KNEE REPLACEMENT: Status: ACTIVE | Noted: 2018-03-21

## 2018-03-21 RX ORDER — GABAPENTIN 100 MG/1
CAPSULE ORAL
Qty: 450 CAPSULE | Refills: 0 | Status: ON HOLD | OUTPATIENT
Start: 2018-03-21 | End: 2018-03-27

## 2018-03-21 NOTE — TELEPHONE ENCOUNTER
Medication: Gabapentin    Date of last refill: 12/26/17 90 day supply  [place]    Last office visit: 2/20/18  Due back to clinic per last office note:   \"He will follow-up several weeks later and see how is doing\"

## 2018-03-23 NOTE — H&P
[facility]    PATIENT'S NAME: Jh Watson   ATTENDING PHYSICIAN: Brooks Freeman M.D.    PATIENT ACCOUNT#:   [de-identified]    LOCATION:  OR   [facility]  MEDICAL RECORD #:   [id number]       YOB: 1950  ADMISSION DATE:       03/27/2018    HI scenarios are stressed. Possibility of continued pain, stiffness, vascular compromise, DVT, infection, hemarthrosis, surgical failure, residual disability is understood. He is well counseled. He will go to surgery with informed consent.     Dictated By Az Boateng

## 2018-03-26 ENCOUNTER — ANESTHESIA EVENT (OUTPATIENT)
Dept: SURGERY | Facility: HOSPITAL | Age: 68
DRG: 470 | End: 2018-03-26
Payer: MEDICARE

## 2018-03-27 ENCOUNTER — ANESTHESIA (OUTPATIENT)
Dept: SURGERY | Facility: HOSPITAL | Age: 68
DRG: 470 | End: 2018-03-27
Payer: MEDICARE

## 2018-03-27 ENCOUNTER — APPOINTMENT (OUTPATIENT)
Dept: GENERAL RADIOLOGY | Facility: HOSPITAL | Age: 68
DRG: 470 | End: 2018-03-27
Attending: ORTHOPAEDIC SURGERY
Payer: MEDICARE

## 2018-03-27 ENCOUNTER — SURGERY (OUTPATIENT)
Age: 68
End: 2018-03-27

## 2018-03-27 ENCOUNTER — HOSPITAL ENCOUNTER (INPATIENT)
Facility: HOSPITAL | Age: 68
LOS: 4 days | Discharge: HOME HEALTH | DRG: 470 | End: 2018-03-31
Attending: ORTHOPAEDIC SURGERY | Admitting: ORTHOPAEDIC SURGERY
Payer: MEDICARE

## 2018-03-27 DIAGNOSIS — M17.12 PRIMARY OSTEOARTHRITIS OF LEFT KNEE: Primary | ICD-10-CM

## 2018-03-27 PROCEDURE — 0SRD0J9 REPLACEMENT OF LEFT KNEE JOINT WITH SYNTHETIC SUBSTITUTE, CEMENTED, OPEN APPROACH: ICD-10-PCS | Performed by: ORTHOPAEDIC SURGERY

## 2018-03-27 PROCEDURE — 3E0T3BZ INTRODUCTION OF ANESTHETIC AGENT INTO PERIPHERAL NERVES AND PLEXI, PERCUTANEOUS APPROACH: ICD-10-PCS | Performed by: ANESTHESIOLOGY

## 2018-03-27 PROCEDURE — 73560 X-RAY EXAM OF KNEE 1 OR 2: CPT | Performed by: ORTHOPAEDIC SURGERY

## 2018-03-27 DEVICE — PSN ALL POLY PAT PLY 38MM: Type: IMPLANTABLE DEVICE | Site: KNEE | Status: FUNCTIONAL

## 2018-03-27 DEVICE — KIT TISS CLOS TISSEEL 4ML: Type: IMPLANTABLE DEVICE | Site: KNEE | Status: FUNCTIONAL

## 2018-03-27 DEVICE — PSN TIB STM 5 DEG SZ G L: Type: IMPLANTABLE DEVICE | Site: KNEE | Status: FUNCTIONAL

## 2018-03-27 DEVICE — CEMENT BONE ZIM PALICOS R +G: Type: IMPLANTABLE DEVICE | Site: KNEE | Status: FUNCTIONAL

## 2018-03-27 RX ORDER — MEPERIDINE HYDROCHLORIDE 25 MG/ML
12.5 INJECTION INTRAMUSCULAR; INTRAVENOUS; SUBCUTANEOUS AS NEEDED
Status: DISCONTINUED | OUTPATIENT
Start: 2018-03-27 | End: 2018-03-27 | Stop reason: HOSPADM

## 2018-03-27 RX ORDER — MORPHINE SULFATE 4 MG/ML
2 INJECTION, SOLUTION INTRAMUSCULAR; INTRAVENOUS EVERY 2 HOUR PRN
Status: DISCONTINUED | OUTPATIENT
Start: 2018-03-27 | End: 2018-03-28

## 2018-03-27 RX ORDER — SODIUM PHOSPHATE, DIBASIC AND SODIUM PHOSPHATE, MONOBASIC 7; 19 G/133ML; G/133ML
1 ENEMA RECTAL ONCE AS NEEDED
Status: COMPLETED | OUTPATIENT
Start: 2018-03-27 | End: 2018-03-29

## 2018-03-27 RX ORDER — LISINOPRIL 5 MG/1
5 TABLET ORAL DAILY
COMMUNITY
End: 2018-11-19

## 2018-03-27 RX ORDER — SODIUM CHLORIDE, SODIUM LACTATE, POTASSIUM CHLORIDE, CALCIUM CHLORIDE 600; 310; 30; 20 MG/100ML; MG/100ML; MG/100ML; MG/100ML
INJECTION, SOLUTION INTRAVENOUS CONTINUOUS
Status: DISCONTINUED | OUTPATIENT
Start: 2018-03-27 | End: 2018-03-27 | Stop reason: HOSPADM

## 2018-03-27 RX ORDER — ONDANSETRON 2 MG/ML
4 INJECTION INTRAMUSCULAR; INTRAVENOUS AS NEEDED
Status: DISCONTINUED | OUTPATIENT
Start: 2018-03-27 | End: 2018-03-27 | Stop reason: HOSPADM

## 2018-03-27 RX ORDER — GABAPENTIN 300 MG/1
300 CAPSULE ORAL NIGHTLY
Status: DISCONTINUED | OUTPATIENT
Start: 2018-03-27 | End: 2018-03-31

## 2018-03-27 RX ORDER — LISINOPRIL 5 MG/1
5 TABLET ORAL DAILY
Status: DISCONTINUED | OUTPATIENT
Start: 2018-03-27 | End: 2018-03-30

## 2018-03-27 RX ORDER — GABAPENTIN 100 MG/1
100 CAPSULE ORAL EVERY MORNING
Status: DISCONTINUED | OUTPATIENT
Start: 2018-03-27 | End: 2018-03-31

## 2018-03-27 RX ORDER — OXYCODONE HCL 10 MG/1
10 TABLET, FILM COATED, EXTENDED RELEASE ORAL
Status: COMPLETED | OUTPATIENT
Start: 2018-03-27 | End: 2018-03-27

## 2018-03-27 RX ORDER — FERROUS SULFATE 325(65) MG
1 TABLET ORAL DAILY
Status: DISCONTINUED | OUTPATIENT
Start: 2018-03-27 | End: 2018-03-27

## 2018-03-27 RX ORDER — ACETAMINOPHEN 325 MG/1
650 TABLET ORAL 4 TIMES DAILY
Status: DISCONTINUED | OUTPATIENT
Start: 2018-03-27 | End: 2018-03-28

## 2018-03-27 RX ORDER — BISACODYL 10 MG
10 SUPPOSITORY, RECTAL RECTAL
Status: DISCONTINUED | OUTPATIENT
Start: 2018-03-27 | End: 2018-03-31

## 2018-03-27 RX ORDER — GABAPENTIN 100 MG/1
100 CAPSULE ORAL EVERY MORNING
COMMUNITY
End: 2018-04-09

## 2018-03-27 RX ORDER — TRAMADOL HYDROCHLORIDE 50 MG/1
50 TABLET ORAL EVERY 12 HOURS
Status: DISCONTINUED | OUTPATIENT
Start: 2018-03-27 | End: 2018-03-31

## 2018-03-27 RX ORDER — MORPHINE SULFATE 4 MG/ML
4 INJECTION, SOLUTION INTRAMUSCULAR; INTRAVENOUS EVERY 2 HOUR PRN
Status: DISCONTINUED | OUTPATIENT
Start: 2018-03-27 | End: 2018-03-27

## 2018-03-27 RX ORDER — POLYETHYLENE GLYCOL 3350 17 G/17G
17 POWDER, FOR SOLUTION ORAL DAILY PRN
Status: DISCONTINUED | OUTPATIENT
Start: 2018-03-27 | End: 2018-03-31

## 2018-03-27 RX ORDER — METOCLOPRAMIDE HYDROCHLORIDE 5 MG/ML
10 INJECTION INTRAMUSCULAR; INTRAVENOUS AS NEEDED
Status: DISCONTINUED | OUTPATIENT
Start: 2018-03-27 | End: 2018-03-27 | Stop reason: HOSPADM

## 2018-03-27 RX ORDER — DOCUSATE SODIUM 100 MG/1
100 CAPSULE, LIQUID FILLED ORAL 2 TIMES DAILY
Status: DISCONTINUED | OUTPATIENT
Start: 2018-03-27 | End: 2018-03-27

## 2018-03-27 RX ORDER — GABAPENTIN 100 MG/1
100 CAPSULE ORAL EVERY EVENING
Status: DISCONTINUED | OUTPATIENT
Start: 2018-03-27 | End: 2018-03-31

## 2018-03-27 RX ORDER — MORPHINE SULFATE 4 MG/ML
1.5 INJECTION, SOLUTION INTRAMUSCULAR; INTRAVENOUS EVERY 2 HOUR PRN
Status: DISCONTINUED | OUTPATIENT
Start: 2018-03-27 | End: 2018-03-27

## 2018-03-27 RX ORDER — MORPHINE SULFATE 4 MG/ML
6 INJECTION, SOLUTION INTRAMUSCULAR; INTRAVENOUS EVERY 2 HOUR PRN
Status: DISCONTINUED | OUTPATIENT
Start: 2018-03-27 | End: 2018-03-27

## 2018-03-27 RX ORDER — MORPHINE SULFATE 4 MG/ML
1 INJECTION, SOLUTION INTRAMUSCULAR; INTRAVENOUS EVERY 2 HOUR PRN
Status: DISCONTINUED | OUTPATIENT
Start: 2018-03-27 | End: 2018-03-27

## 2018-03-27 RX ORDER — MORPHINE SULFATE 4 MG/ML
2.5 INJECTION, SOLUTION INTRAMUSCULAR; INTRAVENOUS EVERY 2 HOUR PRN
Status: DISCONTINUED | OUTPATIENT
Start: 2018-03-27 | End: 2018-03-27

## 2018-03-27 RX ORDER — ACETAMINOPHEN 325 MG/1
TABLET ORAL
Status: COMPLETED
Start: 2018-03-27 | End: 2018-03-27

## 2018-03-27 RX ORDER — CEFAZOLIN SODIUM/WATER 2 G/20 ML
2 SYRINGE (ML) INTRAVENOUS ONCE
Status: COMPLETED | OUTPATIENT
Start: 2018-03-27 | End: 2018-03-27

## 2018-03-27 RX ORDER — LABETALOL HYDROCHLORIDE 5 MG/ML
5 INJECTION, SOLUTION INTRAVENOUS EVERY 5 MIN PRN
Status: DISCONTINUED | OUTPATIENT
Start: 2018-03-27 | End: 2018-03-27 | Stop reason: HOSPADM

## 2018-03-27 RX ORDER — METOCLOPRAMIDE HYDROCHLORIDE 5 MG/ML
10 INJECTION INTRAMUSCULAR; INTRAVENOUS EVERY 6 HOURS PRN
Status: DISPENSED | OUTPATIENT
Start: 2018-03-27 | End: 2018-03-29

## 2018-03-27 RX ORDER — SODIUM CHLORIDE 9 MG/ML
INJECTION, SOLUTION INTRAVENOUS CONTINUOUS
Status: DISCONTINUED | OUTPATIENT
Start: 2018-03-27 | End: 2018-03-31

## 2018-03-27 RX ORDER — KETOCONAZOLE 20 MG/G
CREAM TOPICAL DAILY
Status: DISCONTINUED | OUTPATIENT
Start: 2018-03-27 | End: 2018-03-31

## 2018-03-27 RX ORDER — MORPHINE SULFATE 4 MG/ML
1 INJECTION, SOLUTION INTRAMUSCULAR; INTRAVENOUS EVERY 2 HOUR PRN
Status: DISCONTINUED | OUTPATIENT
Start: 2018-03-27 | End: 2018-03-28

## 2018-03-27 RX ORDER — TIZANIDINE 2 MG/1
2 TABLET ORAL 3 TIMES DAILY PRN
Status: DISCONTINUED | OUTPATIENT
Start: 2018-03-27 | End: 2018-03-31

## 2018-03-27 RX ORDER — DIPHENHYDRAMINE HYDROCHLORIDE 50 MG/ML
12.5 INJECTION INTRAMUSCULAR; INTRAVENOUS EVERY 4 HOURS PRN
Status: DISCONTINUED | OUTPATIENT
Start: 2018-03-27 | End: 2018-03-28

## 2018-03-27 RX ORDER — MORPHINE SULFATE 4 MG/ML
2 INJECTION, SOLUTION INTRAMUSCULAR; INTRAVENOUS EVERY 5 MIN PRN
Status: DISCONTINUED | OUTPATIENT
Start: 2018-03-27 | End: 2018-03-27 | Stop reason: HOSPADM

## 2018-03-27 RX ORDER — DIPHENHYDRAMINE HCL 25 MG
25 CAPSULE ORAL EVERY 4 HOURS PRN
Status: DISCONTINUED | OUTPATIENT
Start: 2018-03-27 | End: 2018-03-28

## 2018-03-27 RX ORDER — NALOXONE HYDROCHLORIDE 0.4 MG/ML
80 INJECTION, SOLUTION INTRAMUSCULAR; INTRAVENOUS; SUBCUTANEOUS AS NEEDED
Status: DISCONTINUED | OUTPATIENT
Start: 2018-03-27 | End: 2018-03-27 | Stop reason: HOSPADM

## 2018-03-27 RX ORDER — OXYCODONE HYDROCHLORIDE 10 MG/1
10 TABLET ORAL EVERY 4 HOURS PRN
Status: DISCONTINUED | OUTPATIENT
Start: 2018-03-27 | End: 2018-03-28

## 2018-03-27 RX ORDER — OXYCODONE HYDROCHLORIDE 5 MG/1
2.5 TABLET ORAL EVERY 4 HOURS PRN
Status: DISCONTINUED | OUTPATIENT
Start: 2018-03-27 | End: 2018-03-28

## 2018-03-27 RX ORDER — MORPHINE SULFATE 4 MG/ML
5 INJECTION, SOLUTION INTRAMUSCULAR; INTRAVENOUS EVERY 2 HOUR PRN
Status: DISCONTINUED | OUTPATIENT
Start: 2018-03-27 | End: 2018-03-27

## 2018-03-27 RX ORDER — CEFAZOLIN SODIUM/WATER 2 G/20 ML
2 SYRINGE (ML) INTRAVENOUS EVERY 8 HOURS
Status: COMPLETED | OUTPATIENT
Start: 2018-03-27 | End: 2018-03-28

## 2018-03-27 RX ORDER — MORPHINE SULFATE 4 MG/ML
2 INJECTION, SOLUTION INTRAMUSCULAR; INTRAVENOUS EVERY 2 HOUR PRN
Status: DISCONTINUED | OUTPATIENT
Start: 2018-03-27 | End: 2018-03-27

## 2018-03-27 RX ORDER — GABAPENTIN 100 MG/1
100 CAPSULE ORAL EVERY EVENING
COMMUNITY
End: 2018-04-09

## 2018-03-27 RX ORDER — DOCUSATE SODIUM 100 MG/1
100 CAPSULE, LIQUID FILLED ORAL 2 TIMES DAILY
Status: DISCONTINUED | OUTPATIENT
Start: 2018-03-27 | End: 2018-03-31

## 2018-03-27 RX ORDER — ZOLPIDEM TARTRATE 5 MG/1
5 TABLET ORAL NIGHTLY PRN
Status: DISCONTINUED | OUTPATIENT
Start: 2018-03-27 | End: 2018-03-28

## 2018-03-27 RX ORDER — OXYCODONE HYDROCHLORIDE 5 MG/1
5 TABLET ORAL EVERY 4 HOURS PRN
Status: DISCONTINUED | OUTPATIENT
Start: 2018-03-27 | End: 2018-03-28

## 2018-03-27 RX ORDER — DIPHENHYDRAMINE HYDROCHLORIDE 50 MG/ML
25 INJECTION INTRAMUSCULAR; INTRAVENOUS ONCE AS NEEDED
Status: ACTIVE | OUTPATIENT
Start: 2018-03-27 | End: 2018-03-27

## 2018-03-27 RX ORDER — ONDANSETRON 2 MG/ML
4 INJECTION INTRAMUSCULAR; INTRAVENOUS EVERY 4 HOURS PRN
Status: DISCONTINUED | OUTPATIENT
Start: 2018-03-27 | End: 2018-03-31

## 2018-03-27 RX ORDER — GABAPENTIN 100 MG/1
300 CAPSULE ORAL NIGHTLY
COMMUNITY
End: 2018-04-09

## 2018-03-27 RX ORDER — MORPHINE SULFATE 4 MG/ML
4 INJECTION, SOLUTION INTRAMUSCULAR; INTRAVENOUS EVERY 2 HOUR PRN
Status: DISCONTINUED | OUTPATIENT
Start: 2018-03-27 | End: 2018-03-28

## 2018-03-27 RX ORDER — MELATONIN
325
Status: DISCONTINUED | OUTPATIENT
Start: 2018-03-28 | End: 2018-03-31

## 2018-03-27 RX ORDER — ACETAMINOPHEN 325 MG/1
650 TABLET ORAL ONCE
Status: COMPLETED | OUTPATIENT
Start: 2018-03-27 | End: 2018-03-27

## 2018-03-27 NOTE — PHYSICAL THERAPY NOTE
PHYSICAL THERAPY KNEE EVALUATION - INPATIENT     Room Number: 380/380-A  Evaluation Date: 3/27/2018  Type of Evaluation: Initial  Physician Order: PT Eval and Treat    Presenting Problem: S/p Left TKA on 3/27/18  Reason for Therapy: Mobility Dysfunction an precautions)  Fall Risk: High fall risk    WEIGHT BEARING RESTRICTION  Weight Bearing Restriction: L lower extremity           L Lower Extremity: Weight Bearing as Tolerated    PAIN ASSESSMENT  Ratin  Location: surgical site @ L knee  Management Haim Garcia ABILITY STATUS  Gait Assessment   Gait Assistance: Dependent assistance (actual level- mod assist x2)  Distance (ft): 25 ft  Assistive Device: Rolling walker  Pattern: L Decreased stance time  Stoop/Curb Assistance: Not tested  Comment : Above scores based Functional outcome measures completed include scores from AM PAC. Based on this evaluation, patient's clinical presentation is stable and overall the evaluation complexity is considered low.   These impairments and comorbidities manifest themselves as func

## 2018-03-27 NOTE — OPERATIVE REPORT
[facility]    PATIENT'S NAME: Toni Severino   ATTENDING PHYSICIAN: Irean Collet, M.D. OPERATING PHYSICIAN: Irean Collet, M.D.    PATIENT ACCOUNT#:   [de-identified]    LOCATION:  PACU [address] [place], [place] [address]U [facility]  MEDICAL RECORD #:   [id number]       DATE OF BIR Preoperatively, he has lack of flexion and varus alignment. Midline incision is carried out, followed by median parapatellar arthrotomy. The deep medial collateral ligament is cautiously released. Synovium is reflected. Patella is everted.   Advanced ar Karen Mejia M.D.  d: 03/27/2018 11:05:02  t: 03/27/2018 11:32:50  [facility] [id number]  MA/

## 2018-03-27 NOTE — PROCEDURES
Intraoperative spinal placement:    Patient in sitting position. Sedated per anesthesia record. EKG, BP and SpO2 monitors in place. Supplemental oxygen in place. Back prepped with Chloraprep. Sterile drape placed. Sterile gloves, hat, mask worn.   1%

## 2018-03-27 NOTE — INTERVAL H&P NOTE
Pre-op Diagnosis: Primary osteoarthritis of left knee [M17.12]    The above referenced H&P was reviewed by Andreia Lindsey MD on 3/27/2018, the patient was examined and no significant changes have occurred in the patient's condition since the H&P was perf

## 2018-03-27 NOTE — ANESTHESIA PREPROCEDURE EVALUATION
PRE-OP EVALUATION    Patient Name: Davina Gillespie    Pre-op Diagnosis: Primary osteoarthritis of left knee [M17.12]    Procedure(s):  LEFT TOTAL KNEE ARTHROPLASTY    Surgeon(s) and Role:     * Phyllis Poe MD - Primary    Pre-op vitals reviewed. Pulmonary    Negative pulmonary ROS. Neuro/Psych    Negative neuro/psych ROS.                           History of DVT, not taking any AC      Past Surgical History:  01/03/2018: BACK SURGERY      Comment: THORACIC 10 AND THORACIC 11 or emergency was discussed. We also discussed the risks and benefits of an adductor canal block. Patient understands risks and benefits of proposed anesthesia plan and agrees to proceed.       Jessy Lewis MD  Anesthesiologist  Pager [phone]

## 2018-03-27 NOTE — ANESTHESIA POSTPROCEDURE EVALUATION
[address] Patient Status:  Surgery Admit   Age/Gender 77year old male MRN [id number]   Location [facility] Attending Jerod Sterling MD   Hosp Day # 0 PCP Deepa Gustafson MD       Anesthesia Post-

## 2018-03-27 NOTE — BRIEF OP NOTE
Pre-Operative Diagnosis: Primary osteoarthritis of left knee [M17.12]     Post-Operative Diagnosis: Primary osteoarthritis of left knee [M17.12]      Procedure Performed:   Procedure(s):  LEFT TOTAL KNEE ARTHROPLASTY    Surgeon(s) and Role:     * Kaylee

## 2018-03-28 PROBLEM — Z47.89 ORTHOPEDIC AFTERCARE: Status: ACTIVE | Noted: 2018-03-28

## 2018-03-28 RX ORDER — ACETAMINOPHEN 325 MG/1
650 TABLET ORAL 4 TIMES DAILY
Status: COMPLETED | OUTPATIENT
Start: 2018-03-28 | End: 2018-03-28

## 2018-03-28 RX ORDER — HYDROCODONE BITARTRATE AND ACETAMINOPHEN 10; 325 MG/1; MG/1
1 TABLET ORAL EVERY 4 HOURS PRN
Status: DISCONTINUED | OUTPATIENT
Start: 2018-03-29 | End: 2018-03-31

## 2018-03-28 RX ORDER — ONDANSETRON 4 MG/1
4 TABLET, ORALLY DISINTEGRATING ORAL EVERY 4 HOURS PRN
Status: DISCONTINUED | OUTPATIENT
Start: 2018-03-28 | End: 2018-03-31

## 2018-03-28 RX ORDER — MORPHINE SULFATE 4 MG/ML
3 INJECTION, SOLUTION INTRAMUSCULAR; INTRAVENOUS EVERY 2 HOUR PRN
Status: DISCONTINUED | OUTPATIENT
Start: 2018-03-28 | End: 2018-03-28

## 2018-03-28 RX ORDER — MORPHINE SULFATE 4 MG/ML
1 INJECTION, SOLUTION INTRAMUSCULAR; INTRAVENOUS EVERY 2 HOUR PRN
Status: DISCONTINUED | OUTPATIENT
Start: 2018-03-28 | End: 2018-03-31

## 2018-03-28 RX ORDER — OXYCODONE HYDROCHLORIDE 5 MG/1
5 TABLET ORAL EVERY 4 HOURS PRN
Status: DISPENSED | OUTPATIENT
Start: 2018-03-28 | End: 2018-03-29

## 2018-03-28 RX ORDER — OXYCODONE HYDROCHLORIDE 5 MG/1
2.5 TABLET ORAL EVERY 4 HOURS PRN
Status: ACTIVE | OUTPATIENT
Start: 2018-03-28 | End: 2018-03-29

## 2018-03-28 RX ORDER — HYDROCODONE BITARTRATE AND ACETAMINOPHEN 10; 325 MG/1; MG/1
2 TABLET ORAL EVERY 4 HOURS PRN
Status: DISCONTINUED | OUTPATIENT
Start: 2018-03-29 | End: 2018-03-31

## 2018-03-28 RX ORDER — MORPHINE SULFATE 4 MG/ML
2 INJECTION, SOLUTION INTRAMUSCULAR; INTRAVENOUS EVERY 2 HOUR PRN
Status: DISCONTINUED | OUTPATIENT
Start: 2018-03-28 | End: 2018-03-31

## 2018-03-28 RX ORDER — OXYCODONE HYDROCHLORIDE 10 MG/1
10 TABLET ORAL EVERY 4 HOURS PRN
Status: DISPENSED | OUTPATIENT
Start: 2018-03-28 | End: 2018-03-29

## 2018-03-28 NOTE — CM/SW NOTE
Patient fully independent prior to surgery.  Confirms would like [facility] at [address]     03/28/18 1500   CM/SW Referral Data   Referral Source    Reason for Referral Discharge planning   Informant Patient   Pertinent Medical Hx

## 2018-03-28 NOTE — PROGRESS NOTES
[facility]  Progress Note    Elmo Jean Baptiste Patient Status:  Inpatient    1950 MRN [id number]   [facility] 3SW-A Attending Juanna Leyden, MD   [address] Day # 1 PCP Aidan Romero MD     Subjective:  Elmo Jean Baptiste is a(n) 77 y Intravenous Q4H PRN   Metoclopramide HCl (REGLAN) injection 10 mg 10 mg Intravenous Q6H PRN   Prochlorperazine Edisylate (COMPAZINE) injection 10 mg 10 mg Intravenous Q6H PRN   ferrous sulfate EC tab 325 mg 325 mg Oral Daily with breakfast   diphenhydrAMIN knee shows advanced medial compartment osteoarthritis with severe patellofemoral change. MD PAZ Storey/Lennox Casanova PT NAME: Jin Mccarty MRN: [id number] DD: 03/21/2018 17:26 TD: 03/21/2018 17:53 Job #: [id number]       Assessment:  Patient Ac

## 2018-03-28 NOTE — PROGRESS NOTES
[facility]  Orthopedic Surgery  Progress Note    Elmo Jean Baptiste Patient Status:  Inpatient    1950 MRN [id number]   [facility] 3SW-A Attending Juanna Leyden, MD   Hosp Day # 1 PCP Aidan Romero MD     SUBJECTIVE:  INT PT/OT  4. Discharge plannin [address]. 5. Continue medical management  6.  Follow up in office with Melo Robert MD in 2 weeks      Tiffanie Lorenz MD  3/28/2018  7:00 AM

## 2018-03-28 NOTE — OCCUPATIONAL THERAPY NOTE
OCCUPATIONAL THERAPY EVALUATION - INPATIENT     Room Number: 380/380-A  Evaluation Date: 3/28/2018  Type of Evaluation: Initial  Presenting Problem: L TKA    Physician Order: IP Consult to Occupational Therapy  Reason for Therapy: ADL/IADL Dysfunction and wears glasses only for reading    PERCEPTION  Overall Perception Status:   WFL - within functional limits    SENSATION  Light touch:  intact    Communication: WFL    Behavioral/Emotional/Social: WFL    RANGE OF MOTION AND STRENGTH ASSESSMENT  Upper extremi year old male admitted on 3/27/2018 for L TKA. Complete medical history and occupational profile noted above. Functional outcome measures completed include AMPAC. The AM-CONCHA ' '6-Clicks' Inpatient Daily Activity Short Form was completed and  this patient  i perform all functional transfers:  with supervision

## 2018-03-28 NOTE — PHYSICAL THERAPY NOTE
PHYSICAL THERAPY KNEE TREATMENT NOTE - INPATIENT     Room Number: 380/380-A     Session: 1&2   Number of Visits to Meet Established Goals: 3    Presenting Problem: S/p Left TKA on 3/27/18    Problem List  Active Problems:    Primary osteoarthritis of left and from a bed to a chair (including a wheelchair)?: None   -   Need to walk in hospital room?: A Little   -   Climbing 3-5 steps with a railing?: A Little       AM-PAC Score:  Raw Score: 22   PT Approx Degree of Impairment Score: 20.91%   Standardized Sco 10 reps 15 reps   Glut Sets 10 reps 15 reps   Hip Abd/Add 10 reps 15 reps   Heel slides 10 reps 15 reps   Saq 10 reps 15 reps   SLR 10 reps 15 reps   Sitting Knee Flexion 10 reps 15 reps   Standing heel/toe raises 10 reps 15 reps   Standing knee flexion 10 transfers Sit to/from Stand at assistance level: supervison  met    Goal #3     Patient is able to ambulate 300 feet with assistive device at assistance level: supervision  Met     Goal #4     Patient will negotiate 4 stairs/one curb w/ assistive device an

## 2018-03-28 NOTE — HOME CARE LIAISON
DIAGNOSES AND PERTINENT MEDICAL HISTORY: S/P L TKA    FACILITY NAME AND DC DATE:[facility] PENDING DC    BEDSIDE VISIT WITH:TNL MET WITH PATIENT AT BEDSIDE.  PATIENT IS AGREEABLE WITH [facility] SERVICES    SERVICES ORDERED: RN/PT    VERIFIED PATIENT ADDRESS, [place]

## 2018-03-29 RX ORDER — MAGNESIUM CARB/ALUMINUM HYDROX 105-160MG
296 TABLET,CHEWABLE ORAL ONCE
Status: COMPLETED | OUTPATIENT
Start: 2018-03-29 | End: 2018-03-29

## 2018-03-29 RX ORDER — ACETAMINOPHEN 325 MG/1
650 TABLET ORAL EVERY 4 HOURS PRN
Status: COMPLETED | OUTPATIENT
Start: 2018-03-29 | End: 2018-03-31

## 2018-03-29 NOTE — PROGRESS NOTES
[facility]  Progress Note    [facility] Patient Status:  Inpatient    1950 MRN [id number]   [facility] 3SW-A Attending Linda Issa MD   Hosp Day # 2 PCP Tino Walker MD     SUBJECTIVE:  INTERVAL HISTORY: S/P  2  Pr

## 2018-03-29 NOTE — OCCUPATIONAL THERAPY NOTE
OCCUPATIONAL THERAPY TREATMENT NOTE - INPATIENT     Room Number: 380/380-A  Session: 1/3   Number of Visits to Meet Established Goals: 3    Presenting Problem: L TKA    History related to current admission:   Admitted for elective L TKA on 3/27.  [facility] includ off regular upper body clothing?: None  -   Taking care of personal grooming such as brushing teeth?: None  -   Eating meals?: None    AM-PAC Score:  Score: 21  Approx Degree of Impairment: 32.79%  Standardized Score (AM-PAC Scale): 44.27  CMS Modifier (G-

## 2018-03-29 NOTE — PHYSICAL THERAPY NOTE
PHYSICAL THERAPY KNEE TREATMENT NOTE - INPATIENT     Room Number: 380/380-A     Session: 3   Number of Visits to Meet Established Goals: 3    Presenting Problem: S/p Left TKA on 3/27/18    Problem List  Active Problems:    Primary osteoarthritis of left k currently need. ..   -   Moving to and from a bed to a chair (including a wheelchair)?: A Little   -   Need to walk in hospital room?: A Little   -   Climbing 3-5 steps with a railing?: A Little       AM-PAC Score:  Raw Score: 21   PT Approx Degree of Impai good.   was present yes   is a wife and daughter    Knee ROM      L Knee Flexion (degrees): 60     L Knee Extension (degrees): 8    Patient End of Session: Up in chair;Needs met; With [address] [facility] staff;Call light within reach;RN aware of session/findings;A

## 2018-03-29 NOTE — PLAN OF CARE
GASTROINTESTINAL - ADULT    • Maintains or returns to baseline bowel function Progressing        abd distended, firm, denies nausea, states decreased appetite. Last bowel movement 3/26/2018. Suppository, fleets enema, milk of magnesia given, see mar.  Sta

## 2018-03-29 NOTE — PROGRESS NOTES
[facility]  Progress Note    Davina Gillespie Patient Status:  Inpatient    1950 MRN [id number]   [facility] 3SW-A Attending Phyllis Poe MD   [facility] Day # 2 PCP Corrina Herman MD     Subjective:  Davina Gillespie is a(n) 77 y injection 10 mg 10 mg Intravenous Q6H PRN   ferrous sulfate EC tab 325 mg 325 mg Oral Daily with breakfast   lisinopril (PRINIVIL,ZESTRIL) tab 5 mg 5 mg Oral Daily   gabapentin (NEURONTIN) cap 100 mg 100 mg Oral QAM   gabapentin (NEURONTIN) cap 100 mg 100 Assessment:  Patient Active Problem List:     HTN (hypertension)     Primary osteoarthritis of left knee  Global 06/25/2018     S/P laminectomy     Status post left knee replacement     Orthopedic aftercare        Plan:  S/p L TKR  - POD#2  - cont pain

## 2018-03-29 NOTE — PROGRESS NOTES
Acute Pain Service    Post Op Day 2 Ortho Note    Assessed patient in bed. Complaining of constipation and declines opiates for pain. At rest, states his pain is tolerable. Patient able to bear weight on sx leg; equal sensation in BLE.  No further recomm

## 2018-03-30 ENCOUNTER — APPOINTMENT (OUTPATIENT)
Dept: ULTRASOUND IMAGING | Facility: HOSPITAL | Age: 68
DRG: 470 | End: 2018-03-30
Attending: INTERNAL MEDICINE
Payer: MEDICARE

## 2018-03-30 PROCEDURE — 76770 US EXAM ABDO BACK WALL COMP: CPT | Performed by: INTERNAL MEDICINE

## 2018-03-30 PROCEDURE — 99222 1ST HOSP IP/OBS MODERATE 55: CPT | Performed by: INTERNAL MEDICINE

## 2018-03-30 NOTE — PLAN OF CARE
DISCHARGE PLANNING    • Discharge to home or other facility with appropriate resources Progressing        GASTROINTESTINAL - ADULT    • Maintains or returns to baseline bowel function Progressing        Impaired Activities of Daily Living    • Achieve high

## 2018-03-30 NOTE — PROGRESS NOTES
[facility]  Orthopedic Surgery  Progress Note    Rawleigh Westmoreland Patient Status:  Inpatient    1950 MRN [id number]   [facility] 3SW-A Attending Suze Del Angel MD   Hosp Day # 3 PCP Dylan Heck MD     SUBJECTIVE:  INT

## 2018-03-30 NOTE — DISCHARGE SUMMARY
Patient ID:  Jaime Negron  [id number]  77year old  2/22/1950    Admit Date: 3/27/2018    Discharge Date and Time: 3/31/2018     Attending Physician: Marianna Pennington MD    Reason for admission: Primary osteoarthritis of left knee [M17.12]  Primary oste completed. Diclofenac Sodium 75 MG Oral Tab EC        Follow-up with Kim Carvajal MD in 2 weeks.      [facility] Center[facility] Onofer, SABIHA  3/30/2018  10:05 AM

## 2018-03-30 NOTE — PROGRESS NOTES
[facility]  Progress Note    Perla Bustos Patient Status:  Inpatient    1950 MRN [id number]   [facility] 3SW-A Attending Kim Carvajal MD   [facility] Day # 3 PCP Dav Dunbar MD     Subjective:  Perla Bustos is a(n) 77 y gabapentin (NEURONTIN) cap 100 mg 100 mg Oral QPM   gabapentin (NEURONTIN) cap 300 mg 300 mg Oral Nightly   ketoconazole (NIZORAL) 2 % cream  Topical Daily       Labs:     Recent Labs   Lab  03/28/18   0503  03/29/18   0518  03/30/18   0504   WBC  9.0  8 06/25/2018     S/P laminectomy     Status post left knee replacement     Orthopedic aftercare        Plan:  S/p L TKR  Doing well  - POD#3  - cont pain control, PT/OT ordered    Constipation  improved  Cont stool softerners  Inc fluid and activity    HTN

## 2018-03-30 NOTE — PLAN OF CARE
GASTROINTESTINAL - ADULT    • Maintains or returns to baseline bowel function Not Progressing        Has ambulated in hallway, with s/b asst.   Appears tense and in pain. States passing flatus, denies bowel movement. abd distended, denies nausea.   Notifi

## 2018-03-30 NOTE — CONSULTS
[facility]  Report of Consultation    Elmo Jean Baptiste Patient Status:  Inpatient    1950 MRN [id number]   [facility] [place] 3SW-A Attending Juanna Leyden, MD   [facility] Day # 3 PCP Aidan Romero MD     Reason for Consultation:  ZIA/CK can't             tolerate anything    Medications:    Current Facility-Administered Medications:   •  acetaminophen (TYLENOL) tab 650 mg, 650 mg, Oral, Q4H PRN  •  HYDROcodone-acetaminophen (NORCO)  MG per tab 1 tablet, 1 tablet, Oral, Q4H PRN **OR* Pulse 83   Temp 98.1 °F (36.7 °C) (Oral)   Resp 18   Ht 71\"   Wt 210 lb 8.6 oz   SpO2 93%   BMI 29.36 kg/m²   Temp (24hrs), Av.5 °F (36.9 °C), Min:98 °F (36.7 °C), Max:99.4 °F (37.4 °C)       Intake/Output Summary (Last 24 hours) at 18 Tyler Beavers found for: [facility]    Recent Labs   Lab  03/28/18   0503  03/29/18   0518  03/30/18   0504   WBC  9.0  8.3  10.4   HGB  13.1  11.6*  10.8*   MCV  93.9  94.3  93.9   PLT  218.0  212.0  192.0       Recent Labs   Lab  03/28/18   0503  03/30/18   0506  03

## 2018-03-30 NOTE — PHYSICAL THERAPY NOTE
PHYSICAL THERAPY KNEE TREATMENT NOTE - INPATIENT     Room Number: 380/380-A     Session: 4   Number of Visits to Meet Established Goals: 3    Presenting Problem: S/p Left TKA on 3/27/18    Problem List  Active Problems:    Primary osteoarthritis of left k currently need. ..   -   Moving to and from a bed to a chair (including a wheelchair)?: None   -   Need to walk in hospital room?: None   -   Climbing 3-5 steps with a railing?: A Little       AM-PAC Score:  Raw Score: 23   PT Approx Degree of Impairment Sc and concerns addressed;SCDs in place; Ice applied    ASSESSMENT   Pt participated in PT today with good tolerance. Pt able to improve gait on levels and on stairs, also improved tolerance for therex.   Pt has met all PT goals and has been issued rw for home

## 2018-03-30 NOTE — OCCUPATIONAL THERAPY NOTE
OCCUPATIONAL THERAPY TREATMENT NOTE - INPATIENT     Room Number: 380/380-A  Session:   Number of Visits to Meet Established Goals: 3    Presenting Problem: L TKA    History related to current admission:   Admitted for elective L TKA on 3/27.  PMH includes T bedpan or urinal? : A Little  -   Putting on and taking off regular upper body clothing?: None  -   Taking care of personal grooming such as brushing teeth?: None  -   Eating meals?: None    AM-PAC Score:  Score: 21  Approx Degree of Impairment: 32.79%  St supervision  Patient will perform toileting: with supervision     Functional Transfer Goals  Patient will perform all functional transfers:  with supervision

## 2018-03-31 VITALS
BODY MASS INDEX: 29.48 KG/M2 | RESPIRATION RATE: 20 BRPM | SYSTOLIC BLOOD PRESSURE: 117 MMHG | WEIGHT: 210.56 LBS | DIASTOLIC BLOOD PRESSURE: 69 MMHG | HEIGHT: 71 IN | HEART RATE: 84 BPM | TEMPERATURE: 99 F | OXYGEN SATURATION: 98 %

## 2018-03-31 PROCEDURE — 99232 SBSQ HOSP IP/OBS MODERATE 35: CPT | Performed by: INTERNAL MEDICINE

## 2018-03-31 RX ORDER — ACETAMINOPHEN 325 MG/1
650 TABLET ORAL EVERY 6 HOURS PRN
Qty: 1 TABLET | Refills: 0 | Status: SHIPPED
Start: 2018-03-31 | End: 2019-12-16

## 2018-03-31 RX ORDER — ACETAMINOPHEN 325 MG/1
650 TABLET ORAL EVERY 6 HOURS PRN
Status: DISCONTINUED | OUTPATIENT
Start: 2018-03-31 | End: 2018-03-31

## 2018-03-31 RX ORDER — ACETAMINOPHEN 325 MG/1
TABLET ORAL
Status: DISCONTINUED
Start: 2018-03-31 | End: 2018-03-31

## 2018-03-31 NOTE — PROGRESS NOTES
[facility]  Progress Note    Tara Mccartney Patient Status:  Inpatient    1950 MRN [id number]   [facility] 3SW-A Attending Nav Mcgee MD   [facility] Day # 4 PCP Santino Carmichael MD     Subjective:  Tara Mccartney is a(n) 77 y

## 2018-03-31 NOTE — CM/SW NOTE
03/31/18 1400   Discharge disposition   Discharged to: Home-Health   Name of Facillity/Home Care/Hospice Residential

## 2018-03-31 NOTE — PROGRESS NOTES
[facility]  Progress Note    Vicenta Banda Patient Status:  Inpatient    1950 MRN [id number]   [facility] 3SW-A Attending Tatianna Charles MD   [facility] Day # 4 PCP Lord Odell MD     No acute issues      Current Facility-Adm 3606 ml   Output             2275 ml   Net             1331 ml     Last 3 Weights  03/27/18 0812 : 210 lb 8.6 oz  03/01/18 1351 : 210 lb  03/15/18 1701 : 209 lb  03/13/18 1728 : 212 lb  01/18/18 1624 : 215 lb  01/17/18 1103 : 210 lb    General: Alert and 1.97*  1.56*   CA  8.6  8.4  8.5  8.6   GLU  126*  120*  130*  132*       No results for input(s): ALT, AST, ALB, AMYLASE, LIPASE, LDH in the last 72 hours.     Invalid input(s): ALPHOS, TBIL, DBIL, TPROT    No results for input(s): PGLU in the last 72 hour

## 2018-03-31 NOTE — PROGRESS NOTES
[facility]  Progress Note    Rawleigh Quay Patient Status:  Inpatient    1950 MRN [id number]   [facility] 3SW-A Attending Suze Del Angel MD   [facility] Day # 4 PCP Dylan Heck MD     Subjective:  Latonya Salgado is a(n) 77 y

## 2018-03-31 NOTE — OCCUPATIONAL THERAPY NOTE
OCCUPATIONAL THERAPY TREATMENT NOTE - INPATIENT     Room Number: 380/380-A  Session: 2   Number of Visits to Meet Established Goals: 3    Presenting Problem: L TKA    History related to current admission: Admitted for elective L TKA on 3/27.  PMH includes T toilet, bedpan or urinal? : A Little  -   Putting on and taking off regular upper body clothing?: None  -   Taking care of personal grooming such as brushing teeth?: None  -   Eating meals?: None    AM-PAC Score:  Score: 21  Approx Degree of Impairment: 32

## 2018-03-31 NOTE — PLAN OF CARE
DISCHARGE PLANNING    • Discharge to home or other facility with appropriate resources Adequate for Discharge        PAIN - ADULT    • Verbalizes/displays adequate comfort level or patient's stated pain goal Adequate for Discharge        SAFETY ADULT - FAL

## 2018-04-26 ENCOUNTER — OFFICE VISIT (OUTPATIENT)
Dept: SURGERY | Facility: CLINIC | Age: 68
End: 2018-04-26

## 2018-04-26 VITALS
DIASTOLIC BLOOD PRESSURE: 78 MMHG | HEIGHT: 70 IN | RESPIRATION RATE: 16 BRPM | WEIGHT: 208 LBS | BODY MASS INDEX: 29.78 KG/M2 | HEART RATE: 84 BPM | SYSTOLIC BLOOD PRESSURE: 136 MMHG

## 2018-04-26 DIAGNOSIS — M54.16 LUMBAR RADICULOPATHY: Primary | ICD-10-CM

## 2018-04-26 PROCEDURE — 99213 OFFICE O/P EST LOW 20 MIN: CPT | Performed by: NEUROLOGICAL SURGERY

## 2018-04-26 NOTE — PROGRESS NOTES
Patient still has symptoms on the outer side of both legs more on the left side than the right. On both feet near the arch area patient has numbness. Patient is in PT right now for his knee. Knee surgery went good.

## 2018-04-26 NOTE — PROGRESS NOTES
Neurosurgery Clinic Visit  2018    Jaymie Carrero PCP:  Annalise Agrawal MD    1950 MRN [id number]     HISTORY OF PRESENT ILLNESS:  Jaymie Carrero is a(n) 77year old male here for follow-up  He got his knee replacement  He still having dwayne

## 2018-04-26 NOTE — PATIENT INSTRUCTIONS
Refill policies:    • Allow 2-3 business days for refills; controlled substances may take longer.   • Contact your pharmacy at least 5 days prior to running out of medication and have them send an electronic request or submit request through the “request re entire amount billed. Precertification and Prior Authorizations: If your physician has recommended that you have a procedure or additional testing performed.   [facility]) will contact your insurance carrier to obtain pre-certi

## 2018-06-18 DIAGNOSIS — M48.04 THORACIC STENOSIS: ICD-10-CM

## 2018-06-18 RX ORDER — GABAPENTIN 100 MG/1
CAPSULE ORAL
Qty: 450 CAPSULE | Refills: 0 | Status: SHIPPED | OUTPATIENT
Start: 2018-06-18 | End: 2018-07-31

## 2018-06-18 NOTE — TELEPHONE ENCOUNTER
Medication: Gabapentin 100 MG     Date of last refill: 3/21/18  [place]     Last office visit: 4/26/18  Due back to clinic per last office note:  Will follow up after Pt's injections   Date next office visit scheduled

## 2018-08-08 PROCEDURE — 85303 CLOT INHIBIT PROT C ACTIVITY: CPT | Performed by: INTERNAL MEDICINE

## 2018-08-08 PROCEDURE — 85610 PROTHROMBIN TIME: CPT | Performed by: INTERNAL MEDICINE

## 2018-08-08 PROCEDURE — 85732 THROMBOPLASTIN TIME PARTIAL: CPT | Performed by: INTERNAL MEDICINE

## 2018-08-08 PROCEDURE — 36415 COLL VENOUS BLD VENIPUNCTURE: CPT | Performed by: INTERNAL MEDICINE

## 2018-08-08 PROCEDURE — 85705 THROMBOPLASTIN INHIBITION: CPT | Performed by: INTERNAL MEDICINE

## 2018-08-08 PROCEDURE — 85306 CLOT INHIBIT PROT S FREE: CPT | Performed by: INTERNAL MEDICINE

## 2018-08-08 PROCEDURE — 86146 BETA-2 GLYCOPROTEIN ANTIBODY: CPT | Performed by: INTERNAL MEDICINE

## 2018-08-08 PROCEDURE — 81240 F2 GENE: CPT | Performed by: INTERNAL MEDICINE

## 2018-08-08 PROCEDURE — 86147 CARDIOLIPIN ANTIBODY EA IG: CPT | Performed by: INTERNAL MEDICINE

## 2018-08-08 PROCEDURE — 85305 CLOT INHIBIT PROT S TOTAL: CPT | Performed by: INTERNAL MEDICINE

## 2018-08-08 PROCEDURE — 85301 ANTITHROMBIN III ANTIGEN: CPT | Performed by: INTERNAL MEDICINE

## 2018-08-08 PROCEDURE — 85613 RUSSELL VIPER VENOM DILUTED: CPT | Performed by: INTERNAL MEDICINE

## 2018-08-08 PROCEDURE — 81241 F5 GENE: CPT | Performed by: INTERNAL MEDICINE

## 2018-09-10 DIAGNOSIS — M48.04 THORACIC STENOSIS: ICD-10-CM

## 2018-09-10 RX ORDER — GABAPENTIN 100 MG/1
CAPSULE ORAL
Qty: 450 CAPSULE | Refills: 0 | Status: SHIPPED | OUTPATIENT
Start: 2018-09-10 | End: 2018-09-17

## 2018-09-10 NOTE — TELEPHONE ENCOUNTER
Medication: Gabapentin 100 Mg     Date of last refill: 6/18/18  450 Caps     [place]     Last office visit: 4/26/18     Due back to clinic per last office note:   Follow up after injections     Date next office visit s

## 2018-11-12 PROCEDURE — 85610 PROTHROMBIN TIME: CPT | Performed by: INTERNAL MEDICINE

## 2018-11-12 PROCEDURE — 85732 THROMBOPLASTIN TIME PARTIAL: CPT | Performed by: INTERNAL MEDICINE

## 2018-11-12 PROCEDURE — 85613 RUSSELL VIPER VENOM DILUTED: CPT | Performed by: INTERNAL MEDICINE

## 2018-11-12 PROCEDURE — 85705 THROMBOPLASTIN INHIBITION: CPT | Performed by: INTERNAL MEDICINE

## 2018-12-07 DIAGNOSIS — M48.04 THORACIC STENOSIS: ICD-10-CM

## 2018-12-07 RX ORDER — GABAPENTIN 100 MG/1
CAPSULE ORAL
Qty: 450 CAPSULE | Refills: 0 | Status: SHIPPED | OUTPATIENT
Start: 2018-12-07 | End: 2019-01-17

## 2018-12-07 NOTE — TELEPHONE ENCOUNTER
Medication: Gabapentin 100 Mg     Date of last refill: 9/10/18  450 Caps     [place]     Last office visit: 4/26/18     Due back to clinic per last office note: Follow up after injections.      Date next office visit

## 2018-12-12 PROBLEM — M17.11 OSTEOARTHRITIS OF RIGHT KNEE, UNSPECIFIED OSTEOARTHRITIS TYPE: Status: ACTIVE | Noted: 2018-12-12

## 2018-12-20 ENCOUNTER — OFFICE VISIT (OUTPATIENT)
Dept: SURGERY | Facility: CLINIC | Age: 68
End: 2018-12-20

## 2018-12-20 VITALS — DIASTOLIC BLOOD PRESSURE: 78 MMHG | HEART RATE: 66 BPM | SYSTOLIC BLOOD PRESSURE: 132 MMHG

## 2018-12-20 DIAGNOSIS — M54.16 LUMBAR RADICULOPATHY: Primary | ICD-10-CM

## 2018-12-20 PROCEDURE — 99213 OFFICE O/P EST LOW 20 MIN: CPT | Performed by: NEUROLOGICAL SURGERY

## 2018-12-20 NOTE — PROGRESS NOTES
Neurosurgery Clinic Visit  2018    Reji Winslow PCP:  Mireya Pate MD    1950 MRN [id number]     HISTORY OF PRESENT ILLNESS:  Reji Winslow is a(n) 77year old male is here for follow-up  Is at a very busy last 6 months  He dealt wi

## 2018-12-20 NOTE — PROGRESS NOTES
Pt is here for follow up for post TFESI - 17128, 51615   TFESI, right L3 and left L2. Pt states that he got relief for 2 days and then the pain came back. Pt states that he got 35% of relief from the injections.      Pain scale: 6/10

## 2019-01-17 ENCOUNTER — APPOINTMENT (OUTPATIENT)
Dept: ULTRASOUND IMAGING | Facility: HOSPITAL | Age: 69
DRG: 176 | End: 2019-01-17
Attending: EMERGENCY MEDICINE
Payer: MEDICARE

## 2019-01-17 ENCOUNTER — HOSPITAL ENCOUNTER (INPATIENT)
Facility: HOSPITAL | Age: 69
LOS: 2 days | Discharge: HOME | DRG: 176 | End: 2019-01-19
Attending: EMERGENCY MEDICINE | Admitting: INTERNAL MEDICINE
Payer: MEDICARE

## 2019-01-17 ENCOUNTER — APPOINTMENT (OUTPATIENT)
Dept: CT IMAGING | Facility: HOSPITAL | Age: 69
DRG: 176 | End: 2019-01-17
Attending: EMERGENCY MEDICINE
Payer: MEDICARE

## 2019-01-17 DIAGNOSIS — I26.99 ACUTE PULMONARY EMBOLISM WITHOUT ACUTE COR PULMONALE, UNSPECIFIED PULMONARY EMBOLISM TYPE (HCC): Primary | ICD-10-CM

## 2019-01-17 DIAGNOSIS — I82.4Z2 DEEP VEIN THROMBOSIS (DVT) OF DISTAL VEIN OF LEFT LOWER EXTREMITY, UNSPECIFIED CHRONICITY (HCC): ICD-10-CM

## 2019-01-17 LAB
ALBUMIN SERPL-MCNC: 3.9 G/DL (ref 3.1–4.5)
ALBUMIN/GLOB SERPL: 1 {RATIO} (ref 1–2)
ALP LIVER SERPL-CCNC: 99 U/L (ref 45–117)
ALT SERPL-CCNC: 17 U/L (ref 17–63)
ANION GAP SERPL CALC-SCNC: 6 MMOL/L (ref 0–18)
APTT PPP: 161.9 SECONDS (ref 26.1–34.6)
APTT PPP: 33.5 SECONDS (ref 26.1–34.6)
AST SERPL-CCNC: 19 U/L (ref 15–41)
ATRIAL RATE: 72 BPM
BASOPHILS # BLD AUTO: 0.05 X10(3) UL (ref 0–0.1)
BASOPHILS NFR BLD AUTO: 0.6 %
BILIRUB SERPL-MCNC: 0.7 MG/DL (ref 0.1–2)
BUN BLD-MCNC: 20 MG/DL (ref 8–20)
BUN/CREAT SERPL: 13.8 (ref 10–20)
CALCIUM BLD-MCNC: 8.8 MG/DL (ref 8.3–10.3)
CHLORIDE SERPL-SCNC: 102 MMOL/L (ref 101–111)
CO2 SERPL-SCNC: 26 MMOL/L (ref 22–32)
CREAT BLD-MCNC: 1.45 MG/DL (ref 0.7–1.3)
EOSINOPHIL # BLD AUTO: 0.27 X10(3) UL (ref 0–0.3)
EOSINOPHIL NFR BLD AUTO: 3.3 %
ERYTHROCYTE [DISTWIDTH] IN BLOOD BY AUTOMATED COUNT: 13.2 % (ref 11.5–16)
GLOBULIN PLAS-MCNC: 3.8 G/DL (ref 2.8–4.4)
GLUCOSE BLD-MCNC: 121 MG/DL (ref 70–99)
HCT VFR BLD AUTO: 40.8 % (ref 37–53)
HGB BLD-MCNC: 13.7 G/DL (ref 13–17)
IMMATURE GRANULOCYTE COUNT: 0.03 X10(3) UL (ref 0–1)
IMMATURE GRANULOCYTE RATIO %: 0.4 %
INR BLD: 1.16 (ref 0.9–1.1)
LYMPHOCYTES # BLD AUTO: 1.61 X10(3) UL (ref 0.9–4)
LYMPHOCYTES NFR BLD AUTO: 19.5 %
M PROTEIN MFR SERPL ELPH: 7.7 G/DL (ref 6.4–8.2)
MCH RBC QN AUTO: 31.6 PG (ref 27–33.2)
MCHC RBC AUTO-ENTMCNC: 33.6 G/DL (ref 31–37)
MCV RBC AUTO: 94.2 FL (ref 80–99)
MONOCYTES # BLD AUTO: 0.68 X10(3) UL (ref 0.1–1)
MONOCYTES NFR BLD AUTO: 8.3 %
NEUTROPHIL ABS PRELIM: 5.6 X10 (3) UL (ref 1.3–6.7)
NEUTROPHILS # BLD AUTO: 5.6 X10(3) UL (ref 1.3–6.7)
NEUTROPHILS NFR BLD AUTO: 67.9 %
OSMOLALITY SERPL CALC.SUM OF ELEC: 282 MOSM/KG (ref 275–295)
P AXIS: 33 DEGREES
P-R INTERVAL: 158 MS
PLATELET # BLD AUTO: 232 10(3)UL (ref 150–450)
POTASSIUM SERPL-SCNC: 4.5 MMOL/L (ref 3.6–5.1)
PSA SERPL DL<=0.01 NG/ML-MCNC: 15.3 SECONDS (ref 12.4–14.7)
Q-T INTERVAL: 378 MS
QRS DURATION: 100 MS
QTC CALCULATION (BEZET): 413 MS
R AXIS: -6 DEGREES
RBC # BLD AUTO: 4.33 X10(6)UL (ref 3.8–5.8)
RED CELL DISTRIBUTION WIDTH-SD: 46.1 FL (ref 35.1–46.3)
SODIUM SERPL-SCNC: 134 MMOL/L (ref 136–144)
T AXIS: 53 DEGREES
TROPONIN I SERPL-MCNC: <0.046 NG/ML (ref ?–0.05)
VENTRICULAR RATE: 72 BPM
WBC # BLD AUTO: 8.2 X10(3) UL (ref 4–13)

## 2019-01-17 PROCEDURE — 85610 PROTHROMBIN TIME: CPT | Performed by: EMERGENCY MEDICINE

## 2019-01-17 PROCEDURE — 93971 EXTREMITY STUDY: CPT | Performed by: EMERGENCY MEDICINE

## 2019-01-17 PROCEDURE — 99285 EMERGENCY DEPT VISIT HI MDM: CPT | Performed by: EMERGENCY MEDICINE

## 2019-01-17 PROCEDURE — 85730 THROMBOPLASTIN TIME PARTIAL: CPT | Performed by: EMERGENCY MEDICINE

## 2019-01-17 PROCEDURE — 96365 THER/PROPH/DIAG IV INF INIT: CPT | Performed by: EMERGENCY MEDICINE

## 2019-01-17 PROCEDURE — 84484 ASSAY OF TROPONIN QUANT: CPT | Performed by: EMERGENCY MEDICINE

## 2019-01-17 PROCEDURE — 71275 CT ANGIOGRAPHY CHEST: CPT | Performed by: EMERGENCY MEDICINE

## 2019-01-17 PROCEDURE — 85025 COMPLETE CBC W/AUTO DIFF WBC: CPT | Performed by: EMERGENCY MEDICINE

## 2019-01-17 PROCEDURE — 96361 HYDRATE IV INFUSION ADD-ON: CPT | Performed by: EMERGENCY MEDICINE

## 2019-01-17 PROCEDURE — 80053 COMPREHEN METABOLIC PANEL: CPT | Performed by: EMERGENCY MEDICINE

## 2019-01-17 PROCEDURE — 93005 ELECTROCARDIOGRAM TRACING: CPT

## 2019-01-17 PROCEDURE — 93010 ELECTROCARDIOGRAM REPORT: CPT | Performed by: EMERGENCY MEDICINE

## 2019-01-17 PROCEDURE — 85730 THROMBOPLASTIN TIME PARTIAL: CPT | Performed by: INTERNAL MEDICINE

## 2019-01-17 RX ORDER — GABAPENTIN 300 MG/1
300 CAPSULE ORAL NIGHTLY
Status: DISCONTINUED | OUTPATIENT
Start: 2019-01-17 | End: 2019-01-19

## 2019-01-17 RX ORDER — HEPARIN SODIUM AND DEXTROSE 10000; 5 [USP'U]/100ML; G/100ML
18 INJECTION INTRAVENOUS ONCE
Status: COMPLETED | OUTPATIENT
Start: 2019-01-17 | End: 2019-01-17

## 2019-01-17 RX ORDER — ONDANSETRON 2 MG/ML
4 INJECTION INTRAMUSCULAR; INTRAVENOUS EVERY 4 HOURS PRN
Status: DISCONTINUED | OUTPATIENT
Start: 2019-01-17 | End: 2019-01-19

## 2019-01-17 RX ORDER — HEPARIN SODIUM AND DEXTROSE 10000; 5 [USP'U]/100ML; G/100ML
INJECTION INTRAVENOUS CONTINUOUS
Status: DISCONTINUED | OUTPATIENT
Start: 2019-01-17 | End: 2019-01-19

## 2019-01-17 RX ORDER — GABAPENTIN 100 MG/1
100 CAPSULE ORAL SEE ADMIN INSTRUCTIONS
Status: DISCONTINUED | OUTPATIENT
Start: 2019-01-17 | End: 2019-01-17 | Stop reason: SDUPTHER

## 2019-01-17 RX ORDER — ACETAMINOPHEN 325 MG/1
650 TABLET ORAL EVERY 6 HOURS PRN
Status: DISCONTINUED | OUTPATIENT
Start: 2019-01-17 | End: 2019-01-19

## 2019-01-17 RX ORDER — GABAPENTIN 100 MG/1
200 CAPSULE ORAL EVERY MORNING
Status: DISCONTINUED | OUTPATIENT
Start: 2019-01-18 | End: 2019-01-19

## 2019-01-17 RX ORDER — SODIUM CHLORIDE 9 MG/ML
1000 INJECTION, SOLUTION INTRAVENOUS ONCE
Status: COMPLETED | OUTPATIENT
Start: 2019-01-17 | End: 2019-01-17

## 2019-01-17 RX ORDER — LISINOPRIL 5 MG/1
5 TABLET ORAL DAILY
Status: DISCONTINUED | OUTPATIENT
Start: 2019-01-18 | End: 2019-01-19

## 2019-01-17 RX ORDER — ASPIRIN 81 MG/1
324 TABLET, CHEWABLE ORAL ONCE
Status: COMPLETED | OUTPATIENT
Start: 2019-01-17 | End: 2019-01-17

## 2019-01-17 RX ORDER — SODIUM CHLORIDE 9 MG/ML
INJECTION, SOLUTION INTRAVENOUS CONTINUOUS
Status: ACTIVE | OUTPATIENT
Start: 2019-01-17 | End: 2019-01-17

## 2019-01-17 RX ORDER — LISINOPRIL 5 MG/1
5 TABLET ORAL DAILY
COMMUNITY
End: 2019-12-10

## 2019-01-17 RX ORDER — GABAPENTIN 100 MG/1
100 CAPSULE ORAL SEE ADMIN INSTRUCTIONS
COMMUNITY
End: 2019-12-10

## 2019-01-17 RX ORDER — HEPARIN SODIUM 5000 [USP'U]/ML
80 INJECTION INTRAVENOUS; SUBCUTANEOUS ONCE
Status: COMPLETED | OUTPATIENT
Start: 2019-01-17 | End: 2019-01-17

## 2019-01-17 NOTE — PLAN OF CARE
Admitted from ER   Admission and medication information received   Orders received and carried out   Heparin gtt infusing per protocol   Pulm and hematology on consult   LLE edematous- denies pain or SOB   Plan of care discussed, will continue to monitor

## 2019-01-17 NOTE — CONSULTS
Pulmonary Consult     Assessment / Plan:  1. 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  Will follow    Whit Barry MD  Pulmonary and Critical Care Medicine      History of Present Illness:   M Disp:  Rfl:  1/17/2019 at Unknown time   apixaban 5 MG Oral Tab Take 1 tablet (5 mg total) by mouth 2 (two) times daily.  Disp: 60 tablet Rfl: 2 1/17/2019 at Unknown time   acetaminophen 325 MG Oral Tab Take 2 tablets (650 mg total) by mouth every 6 (six) h on file      Transportation needs - non-medical: Not on file    Occupational History      Not on file    Tobacco Use      Smoking status: Never Smoker      Smokeless tobacco: Never Used    Substance and Sexual Activity      Alcohol use:  Yes        Alcohol/

## 2019-01-17 NOTE — ED PROVIDER NOTES
Patient Seen in: [facility] Emergency Department    History   Patient presents with:  Deep Vein Thrombosis (cardiovascular)  Chest Pain Angina (cardiovascular)    Stated Complaint: dvt/increase thirst    HPI    The patient is a 78-year-old male who LAMINECTOMY 2 LEVEL N/A 1/3/2018    Performed by Surekha Villarreal MD at [address]   • TRANSFORAMINAL EPIDURAL - LUMBAR Bilateral 11/27/2018    Performed by Raman Cabral MD at [address] [facility] History    Tobacco Use all 4 extremities. NEURO: Patient is awake, alert and oriented to time place and person. Motor strength is 5 over 5 in all 4 extremities. There are no gross motor or sensory deficits appreciated. Cranial nerves II through XII are intact.   Patient is answe Extensive deep venous thrombosis extends from the left external iliac vein to the distal posterior tibial veins. History states known left lower extremity DVT from the left common femoral to the popliteal veins. Correlate clinically.   Critical findings d List        Present on Admission  Date Reviewed: 1/14/2019          ICD-10-CM Noted POA    Acute pulmonary embolism without acute cor pulmonale (HCC) I26.99 1/17/2019 Unknown

## 2019-01-18 ENCOUNTER — APPOINTMENT (OUTPATIENT)
Dept: CV DIAGNOSTICS | Facility: HOSPITAL | Age: 69
DRG: 176 | End: 2019-01-18
Attending: INTERNAL MEDICINE
Payer: MEDICARE

## 2019-01-18 LAB
APTT PPP: 70.5 SECONDS (ref 26.1–34.6)
APTT PPP: 71.1 SECONDS (ref 26.1–34.6)

## 2019-01-18 PROCEDURE — 93306 TTE W/DOPPLER COMPLETE: CPT | Performed by: INTERNAL MEDICINE

## 2019-01-18 PROCEDURE — 85730 THROMBOPLASTIN TIME PARTIAL: CPT | Performed by: HOSPITALIST

## 2019-01-18 NOTE — H&P
[facility] Hospitalist History and Physical      CC: increase lle edema, ?  Shortness of breath    PCP: Hiral Cabezas MD    History of Present Illness: Patient is a 77year old male with PMH sig for recurrent DVT, HTN here due to feeling \"off\" and increased tablet Rfl: 2   acetaminophen 325 MG Oral Tab Take 2 tablets (650 mg total) by mouth every 6 (six) hours as needed. Disp: 1 tablet Rfl: 0   Multiple Vitamins-Minerals (MULTI-VITAMIN/MINERALS) Oral Tab Take 1 tablet by mouth daily.  Disp:  Rfl:    Tadalafil 01/17/2019    CA 8.8 01/17/2019    ALB 3.9 01/17/2019    ALKPHO 99 01/17/2019    BILT 0.7 01/17/2019    TP 7.7 01/17/2019    AST 19 01/17/2019    ALT 17 01/17/2019    .9 01/17/2019    INR 1.16 01/17/2019    PTP 15.3 01/17/2019    TROP <0.046 01/17/2

## 2019-01-18 NOTE — PLAN OF CARE
Assumed care of pt 1930. Aox4. RA, lungs clear. Pt denies SOB. Denies chest pain. Respirations easy and unlabored. SR per tele. Heparin infusing 1450 units/hr per PE/DVT protocol. Next PTT in am. Lt leg red, edema noted. Pedal pulse 2+. Pt denies N/T.  Plan

## 2019-01-18 NOTE — PROGRESS NOTES
[address] Patient Status:  Inpatient    1950 MRN [id number]   [facility] 7NE-A Attending Myah Al MD   [facility] Day # 1 PCP Beacher Lombard, MD     Pulm / Critical Care Progress Note     S: pt states 08/21/2017 1.51 (H)   ]    Recent Labs   Lab  01/17/19   1017   ALT  17   AST  19          Imaging: echo pending     ASSESSMENT/PLAN:    1.  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

## 2019-01-18 NOTE — PROGRESS NOTES
Assumed care of patient at 0730  AOx4, RA, NSR on tele  Denies pain, just some \"soreness\" to left leg  Tolerating diet with good appetite  Heparin gtt infusing per order- therapeutic, next PTT in AM  Voids without difficulty  Patient and wife updated on

## 2019-01-19 VITALS
WEIGHT: 205 LBS | BODY MASS INDEX: 28.7 KG/M2 | OXYGEN SATURATION: 98 % | RESPIRATION RATE: 16 BRPM | HEIGHT: 71 IN | SYSTOLIC BLOOD PRESSURE: 107 MMHG | TEMPERATURE: 98 F | HEART RATE: 66 BPM | DIASTOLIC BLOOD PRESSURE: 77 MMHG

## 2019-01-19 LAB
APTT PPP: 62.1 SECONDS (ref 26.1–34.6)
APTT PPP: 70.8 SECONDS (ref 26.1–34.6)

## 2019-01-19 PROCEDURE — 85730 THROMBOPLASTIN TIME PARTIAL: CPT | Performed by: HOSPITALIST

## 2019-01-19 PROCEDURE — 85730 THROMBOPLASTIN TIME PARTIAL: CPT | Performed by: INTERNAL MEDICINE

## 2019-01-19 NOTE — PLAN OF CARE
Pt is alert and oriented x4, NSR on RA. Pt will start xarelto this evening, dose will be given prior to dc.      HEMATOLOGIC - ADULT    • Maintains hematologic stability Adequate for Discharge    • Free from bleeding injury Adequate for Discharge        MUS

## 2019-01-19 NOTE — PROGRESS NOTES
[facility]  Progress Note    Flako Buck Patient Status:  Inpatient    1950 MRN [id number]   [facility] 7NE-A Attending Ravi Jara MD   [address] Day # 2 PCP Cheyenne Ramírez MD     Subjective:  Flako Buck is a(n) of acute bilateral pulmonary emboli. Critical findings discussed with Dr. Steven Lerma at 1230 hr on 1/17/2019 with read back. Assessment and Plan:  ASSESSMENT:    1.  LLE DVT, PE  He had previous episodes of thrombosis  Lifelong anticoagulation is margo

## 2019-01-19 NOTE — DISCHARGE SUMMARY
General Medicine Discharge Summary     Patient ID:  Roby Pablo  77year old  2/22/1950    Admit date: 1/17/2019    Discharge date and time:  1/19/19    Attending Physician: Matthew Carter MD Denies missing any doses. Despite taking Eliquis the left leg felt more swollen today which prompted evaluation.  Denies CP or SOB but reports feeling \"off\" the last 24 hours as well.      Doppler with the ED with extension of LLE DVT and CTA with acute b needed for Erectile Dysfunction.       STOP taking these medications    Pseudoeph-Doxylamine-DM-APAP (NYQUIL OR)    apixaban 5 MG Oral Tab          Activity: activity as tolerated  Diet: regular diet  Wound Care: as directed  Code Status: Full Code      Exa

## 2019-01-19 NOTE — PROGRESS NOTES
[facility] Hospitalist Progress Note                                                                   [address] [facility] [address]  2/22/1950    CC: FU     Interval History:  - Feels well  - LE pain/swelling i and home if doing well.     Oksana Chapman MD  [facility] Hospitalist  Pager: [phone]

## 2019-01-19 NOTE — PLAN OF CARE
HEMATOLOGIC - ADULT    • Maintains hematologic stability Adequate for Discharge    • Free from bleeding injury Adequate for Discharge        MUSCULOSKELETAL - ADULT    • Maintain proper alignment of affected body part Adequate for Discharge        RESPIRAT

## 2019-01-19 NOTE — PLAN OF CARE
Assumed care of pt 1930. Aox4. RA, lungs clear. Denies SOB or CP. SR per tele. Heparin infusing per PE/DVT protocol 1450 units/hr. Next PTT in am. Lt leg non-pitting edema, slightly red in color. Pedal pulses 2+.  Lt foot and toes warm to touch, pt denies N

## 2019-01-19 NOTE — PLAN OF CARE
NURSING DISCHARGE NOTE    Discharged Home via Wheelchair. Accompanied by Family member  Belongings Taken by patient/family. IV removed. Discharge instructions and follow up appts discussed. All questions answered.

## 2019-01-19 NOTE — PROGRESS NOTES
[address] Patient Status:  Inpatient    1950 MRN [id number]   [facility] 7NE-A Attending Jagdish Rangel MD   [facility] Day # 2 PCP Michelle Alicia MD     Pulm / Critical Care Progress Note     S: Pt is feel 08/05/2018 1.83 (H)   03/31/2018 1.56 (H)   03/30/2018 1.97 (H)   12/26/2017 1.40 (H)   08/21/2017 1.51 (H)   ]    Recent Labs   Lab  01/17/19   1017   ALT  17   AST  19       Imaging: Echo: EF 00%; diastolic dysfunction.  PASP 34; rv size normal     ASSE

## 2019-01-24 ENCOUNTER — HOSPITAL ENCOUNTER (EMERGENCY)
Facility: HOSPITAL | Age: 69
Discharge: HOME | End: 2019-01-25
Attending: EMERGENCY MEDICINE
Payer: MEDICARE

## 2019-01-24 ENCOUNTER — APPOINTMENT (OUTPATIENT)
Dept: GENERAL RADIOLOGY | Facility: HOSPITAL | Age: 69
End: 2019-01-24
Payer: MEDICARE

## 2019-01-24 DIAGNOSIS — R07.89 CHEST PAIN, MUSCULOSKELETAL: Primary | ICD-10-CM

## 2019-01-24 PROBLEM — Z47.89 ORTHOPEDIC AFTERCARE: Status: RESOLVED | Noted: 2018-03-28 | Resolved: 2019-01-24

## 2019-01-24 PROBLEM — M54.16 LUMBAR RADICULOPATHY: Status: RESOLVED | Noted: 2017-08-17 | Resolved: 2019-01-24

## 2019-01-24 PROBLEM — M17.11 OSTEOARTHRITIS OF RIGHT KNEE, UNSPECIFIED OSTEOARTHRITIS TYPE: Status: RESOLVED | Noted: 2018-12-12 | Resolved: 2019-01-24

## 2019-01-24 PROBLEM — I26.99 ACUTE PULMONARY EMBOLISM WITHOUT ACUTE COR PULMONALE, UNSPECIFIED PULMONARY EMBOLISM TYPE (HCC): Status: RESOLVED | Noted: 2019-01-17 | Resolved: 2019-01-24

## 2019-01-24 LAB
ALBUMIN SERPL-MCNC: 3.7 G/DL (ref 3.1–4.5)
ALBUMIN/GLOB SERPL: 1.1 {RATIO} (ref 1–2)
ALP LIVER SERPL-CCNC: 96 U/L (ref 45–117)
ALT SERPL-CCNC: 29 U/L (ref 17–63)
ANION GAP SERPL CALC-SCNC: 7 MMOL/L (ref 0–18)
AST SERPL-CCNC: 19 U/L (ref 15–41)
BASOPHILS # BLD AUTO: 0.05 X10(3) UL (ref 0–0.1)
BASOPHILS NFR BLD AUTO: 0.7 %
BILIRUB SERPL-MCNC: 0.4 MG/DL (ref 0.1–2)
BUN BLD-MCNC: 30 MG/DL (ref 8–20)
BUN/CREAT SERPL: 15.5 (ref 10–20)
CALCIUM BLD-MCNC: 9.2 MG/DL (ref 8.3–10.3)
CHLORIDE SERPL-SCNC: 104 MMOL/L (ref 101–111)
CO2 SERPL-SCNC: 28 MMOL/L (ref 22–32)
CREAT BLD-MCNC: 1.94 MG/DL (ref 0.7–1.3)
EOSINOPHIL # BLD AUTO: 0.23 X10(3) UL (ref 0–0.3)
EOSINOPHIL NFR BLD AUTO: 3.2 %
ERYTHROCYTE [DISTWIDTH] IN BLOOD BY AUTOMATED COUNT: 13.2 % (ref 11.5–16)
GLOBULIN PLAS-MCNC: 3.4 G/DL (ref 2.8–4.4)
GLUCOSE BLD-MCNC: 100 MG/DL (ref 70–99)
HCT VFR BLD AUTO: 39 % (ref 37–53)
HGB BLD-MCNC: 13 G/DL (ref 13–17)
IMMATURE GRANULOCYTE COUNT: 0.02 X10(3) UL (ref 0–1)
IMMATURE GRANULOCYTE RATIO %: 0.3 %
LYMPHOCYTES # BLD AUTO: 1.75 X10(3) UL (ref 0.9–4)
LYMPHOCYTES NFR BLD AUTO: 24.1 %
M PROTEIN MFR SERPL ELPH: 7.1 G/DL (ref 6.4–8.2)
MCH RBC QN AUTO: 31 PG (ref 27–33.2)
MCHC RBC AUTO-ENTMCNC: 33.3 G/DL (ref 31–37)
MCV RBC AUTO: 93.1 FL (ref 80–99)
MONOCYTES # BLD AUTO: 0.64 X10(3) UL (ref 0.1–1)
MONOCYTES NFR BLD AUTO: 8.8 %
NEUTROPHIL ABS PRELIM: 4.58 X10 (3) UL (ref 1.3–6.7)
NEUTROPHILS # BLD AUTO: 4.58 X10(3) UL (ref 1.3–6.7)
NEUTROPHILS NFR BLD AUTO: 62.9 %
OSMOLALITY SERPL CALC.SUM OF ELEC: 294 MOSM/KG (ref 275–295)
PLATELET # BLD AUTO: 308 10(3)UL (ref 150–450)
POTASSIUM SERPL-SCNC: 4.1 MMOL/L (ref 3.6–5.1)
RBC # BLD AUTO: 4.19 X10(6)UL (ref 3.8–5.8)
RED CELL DISTRIBUTION WIDTH-SD: 44.7 FL (ref 35.1–46.3)
SODIUM SERPL-SCNC: 139 MMOL/L (ref 136–144)
TROPONIN I SERPL-MCNC: <0.046 NG/ML (ref ?–0.05)
WBC # BLD AUTO: 7.3 X10(3) UL (ref 4–13)

## 2019-01-24 PROCEDURE — 71045 X-RAY EXAM CHEST 1 VIEW: CPT | Performed by: EMERGENCY MEDICINE

## 2019-01-24 PROCEDURE — 85025 COMPLETE CBC W/AUTO DIFF WBC: CPT | Performed by: EMERGENCY MEDICINE

## 2019-01-24 PROCEDURE — 80053 COMPREHEN METABOLIC PANEL: CPT | Performed by: EMERGENCY MEDICINE

## 2019-01-24 PROCEDURE — 80053 COMPREHEN METABOLIC PANEL: CPT

## 2019-01-24 PROCEDURE — 93010 ELECTROCARDIOGRAM REPORT: CPT

## 2019-01-24 PROCEDURE — 84484 ASSAY OF TROPONIN QUANT: CPT | Performed by: EMERGENCY MEDICINE

## 2019-01-24 PROCEDURE — 84484 ASSAY OF TROPONIN QUANT: CPT

## 2019-01-24 PROCEDURE — 99285 EMERGENCY DEPT VISIT HI MDM: CPT

## 2019-01-24 PROCEDURE — 85025 COMPLETE CBC W/AUTO DIFF WBC: CPT

## 2019-01-24 PROCEDURE — 36415 COLL VENOUS BLD VENIPUNCTURE: CPT

## 2019-01-24 PROCEDURE — 93005 ELECTROCARDIOGRAM TRACING: CPT

## 2019-01-24 RX ORDER — LIDOCAINE 50 MG/G
1 PATCH TOPICAL ONCE
Status: DISCONTINUED | OUTPATIENT
Start: 2019-01-24 | End: 2019-01-25

## 2019-01-25 VITALS
RESPIRATION RATE: 16 BRPM | DIASTOLIC BLOOD PRESSURE: 74 MMHG | TEMPERATURE: 98 F | HEIGHT: 71 IN | WEIGHT: 212 LBS | BODY MASS INDEX: 29.68 KG/M2 | SYSTOLIC BLOOD PRESSURE: 133 MMHG | HEART RATE: 65 BPM | OXYGEN SATURATION: 97 %

## 2019-01-25 LAB
ATRIAL RATE: 72 BPM
ATRIAL RATE: 73 BPM
P AXIS: 32 DEGREES
P AXIS: 38 DEGREES
P-R INTERVAL: 180 MS
P-R INTERVAL: 186 MS
Q-T INTERVAL: 380 MS
Q-T INTERVAL: 388 MS
QRS DURATION: 100 MS
QRS DURATION: 102 MS
QTC CALCULATION (BEZET): 416 MS
QTC CALCULATION (BEZET): 427 MS
R AXIS: -13 DEGREES
R AXIS: -16 DEGREES
T AXIS: 27 DEGREES
T AXIS: 40 DEGREES
TROPONIN I SERPL-MCNC: <0.046 NG/ML (ref ?–0.05)
VENTRICULAR RATE: 72 BPM
VENTRICULAR RATE: 73 BPM

## 2019-01-25 PROCEDURE — 93005 ELECTROCARDIOGRAM TRACING: CPT

## 2019-01-25 NOTE — ED PROVIDER NOTES
Patient Seen in: [facility] Emergency Department    History   Patient presents with:  Chest Pain Angina (cardiovascular)    Stated Complaint: cp with coughing, known pe    HPI    75-year-old male past medical history of PE on Xarelto now presents th coughing, known pe  Other systems are as noted in HPI. Constitutional and vital signs reviewed. All other systems reviewed and negative except as noted above.     Physical Exam     ED Triage Vitals [01/24/19 2229]   /78   Pulse 76   Resp 16   Te W/ DIFFERENTIAL[[id number]]                              Final result                 Please view results for these tests on the individual orders.    RAINBOW DRAW BLUE   RAINBOW DRAW LAVENDER   RAINBOW DRAW LIGHT GREEN   RAINBOW DRAW GOLD   CBC W/ DIFFERENT Medication List

## 2019-01-25 NOTE — ED INITIAL ASSESSMENT (HPI)
Pt here c/o chest pain onset one hour ago after coughing. Pt diagnosed with PE last week, on xarelto.

## 2019-03-13 DIAGNOSIS — M48.04 THORACIC STENOSIS: ICD-10-CM

## 2019-03-14 RX ORDER — GABAPENTIN 100 MG/1
CAPSULE ORAL
Qty: 450 CAPSULE | Refills: 0 | Status: SHIPPED | OUTPATIENT
Start: 2019-03-14 | End: 2019-05-03

## 2019-03-14 NOTE — TELEPHONE ENCOUNTER
Pt was last seen in 12/20/18. Pt can follow up with our office or have PCP refill any further medication refills. Called and spoke to Pt     Pt states that he had a blood clot LLE and couldn't proceed with injections. Pt stated that he could would like to follow up with Dr Abdirizak Lake. Suggested to Pt that he will need to follow up with PCP with any additional refills in the mean time before he schedules appointment with Dr Abdirizak Lake. Pt understood and will call to schedule appointment in the near future.

## 2019-06-05 PROBLEM — M17.11 PRIMARY OSTEOARTHRITIS OF RIGHT KNEE: Status: ACTIVE | Noted: 2018-12-12

## 2019-06-18 DIAGNOSIS — M48.04 THORACIC STENOSIS: ICD-10-CM

## 2019-06-19 RX ORDER — GABAPENTIN 100 MG/1
CAPSULE ORAL
Qty: 450 CAPSULE | Refills: 0 | Status: SHIPPED | OUTPATIENT
Start: 2019-06-19 | End: 2019-10-01

## 2019-08-25 PROBLEM — I82.522 CHRONIC DEEP VEIN THROMBOSIS (DVT) OF ILIAC VEIN OF LEFT LOWER EXTREMITY (HCC): Status: ACTIVE | Noted: 2019-08-25

## 2019-09-10 ENCOUNTER — OFFICE VISIT (OUTPATIENT)
Dept: SURGERY | Facility: CLINIC | Age: 69
End: 2019-09-10

## 2019-09-10 VITALS
BODY MASS INDEX: 29.4 KG/M2 | WEIGHT: 210 LBS | DIASTOLIC BLOOD PRESSURE: 80 MMHG | HEIGHT: 71 IN | SYSTOLIC BLOOD PRESSURE: 118 MMHG | HEART RATE: 61 BPM | OXYGEN SATURATION: 98 %

## 2019-09-10 DIAGNOSIS — M54.16 LUMBAR RADICULOPATHY: Primary | ICD-10-CM

## 2019-09-10 DIAGNOSIS — M51.36 DDD (DEGENERATIVE DISC DISEASE), LUMBAR: ICD-10-CM

## 2019-09-10 PROCEDURE — 99213 OFFICE O/P EST LOW 20 MIN: CPT | Performed by: NEUROLOGICAL SURGERY

## 2019-09-10 NOTE — PROGRESS NOTES
Patient presents in office today with reported pain in bilateral low back. With numbmess in bilateral thighs and feet.      Current pain level reported = 5/10

## 2019-09-10 NOTE — PROGRESS NOTES
Neurosurgery Clinic Visit  9/10/2019    Reji Winslow PCP:  Mireya Pate MD    1950 MRN [id number]     HISTORY OF PRESENT ILLNESS:  Reji Winslow is a(n) 68year old male here for follow-up  He was last seen December  He still continues to

## 2019-09-10 NOTE — PROGRESS NOTES
Neurosurgery Clinic Visit  9/10/2019    Yayo Nair PCP:  Nikita Gatselum MD    1950 MRN [id number]     HISTORY OF PRESENT ILLNESS:  Arnevaeh Nair is a(n) 68year old male ***      PHYSICAL EXAMINATION:  Vital Signs:  /80 (BP Location

## 2019-09-17 ENCOUNTER — TELEPHONE (OUTPATIENT)
Dept: NEUROLOGY | Facility: CLINIC | Age: 69
End: 2019-09-17

## 2019-09-23 ENCOUNTER — HOSPITAL ENCOUNTER (OUTPATIENT)
Dept: MRI IMAGING | Facility: HOSPITAL | Age: 69
Discharge: HOME | End: 2019-09-23
Attending: NEUROLOGICAL SURGERY
Payer: MEDICARE

## 2019-09-23 ENCOUNTER — HOSPITAL ENCOUNTER (OUTPATIENT)
Dept: GENERAL RADIOLOGY | Facility: HOSPITAL | Age: 69
Discharge: HOME | End: 2019-09-23
Attending: NEUROLOGICAL SURGERY
Payer: MEDICARE

## 2019-09-23 DIAGNOSIS — M51.36 DDD (DEGENERATIVE DISC DISEASE), LUMBAR: ICD-10-CM

## 2019-09-23 DIAGNOSIS — M54.16 LUMBAR RADICULOPATHY: ICD-10-CM

## 2019-09-23 PROCEDURE — 72114 X-RAY EXAM L-S SPINE BENDING: CPT | Performed by: NEUROLOGICAL SURGERY

## 2019-09-23 PROCEDURE — 72148 MRI LUMBAR SPINE W/O DYE: CPT | Performed by: NEUROLOGICAL SURGERY

## 2019-10-01 DIAGNOSIS — M48.04 THORACIC STENOSIS: ICD-10-CM

## 2019-10-04 RX ORDER — GABAPENTIN 100 MG/1
CAPSULE ORAL
Qty: 450 CAPSULE | Refills: 0 | Status: SHIPPED | OUTPATIENT
Start: 2019-10-04 | End: 2019-12-31

## 2019-10-15 ENCOUNTER — OFFICE VISIT (OUTPATIENT)
Dept: SURGERY | Facility: CLINIC | Age: 69
End: 2019-10-15
Payer: MEDICARE

## 2019-10-15 VITALS — DIASTOLIC BLOOD PRESSURE: 78 MMHG | SYSTOLIC BLOOD PRESSURE: 124 MMHG | HEART RATE: 68 BPM

## 2019-10-15 DIAGNOSIS — M54.16 LUMBAR RADICULOPATHY: Primary | ICD-10-CM

## 2019-10-15 DIAGNOSIS — M51.36 DDD (DEGENERATIVE DISC DISEASE), LUMBAR: ICD-10-CM

## 2019-10-15 PROCEDURE — 99213 OFFICE O/P EST LOW 20 MIN: CPT | Performed by: NEUROLOGICAL SURGERY

## 2019-10-15 NOTE — PROGRESS NOTES
Pt is here for follow up to review imaging:     MRI of the lumbar: Obtained     Xray of the lumbar: Obtained     Pt states that he is still the same since LOV. No new symptome's to be reported.

## 2019-10-15 NOTE — PROGRESS NOTES
Neurosurgery Clinic Visit  10/15/2019    Selene Underwood PCP:  Lucila Hopkins MD    1950 MRN [id number]     HISTORY OF PRESENT ILLNESS:  Selene Underwood is a(n) 68year old male who is here for follow-up for back and leg pain.   He continues to h [facility]  [address] [facility], [address] [facility] [address], [address] [facility]  [phone]  10/15/2019  1:15 PM

## 2019-12-10 ENCOUNTER — OFFICE VISIT (OUTPATIENT)
Dept: SURGERY | Facility: CLINIC | Age: 69
End: 2019-12-10

## 2019-12-10 ENCOUNTER — TELEPHONE (OUTPATIENT)
Dept: SURGERY | Facility: CLINIC | Age: 69
End: 2019-12-10

## 2019-12-10 VITALS — SYSTOLIC BLOOD PRESSURE: 126 MMHG | DIASTOLIC BLOOD PRESSURE: 80 MMHG | HEART RATE: 62 BPM

## 2019-12-10 DIAGNOSIS — M48.061 SPINAL STENOSIS OF LUMBAR REGION, UNSPECIFIED WHETHER NEUROGENIC CLAUDICATION PRESENT: Primary | ICD-10-CM

## 2019-12-10 PROCEDURE — 99213 OFFICE O/P EST LOW 20 MIN: CPT | Performed by: PHYSICIAN ASSISTANT
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
## 2019-12-10 NOTE — TELEPHONE ENCOUNTER
You are scheduled for LUMBAR LAMINECTOMY 2 LEVEL on 01/22/2020 with     Pre-op instructions discussed with patient and surgical packet provided:    · You will need to contact the Pre-admission department at [phone].  You will speak with olya bonilla powders, creams, lotions or sprays on your body as these attract bacteria (germs)  Deodorant and facial creams are acceptable. · (Laundering/cleaning: Chlorhexidine gluconate skin cleansers will cause stains if used with chlorine releasing products.  Rins is available online at [email or web address]. Please call the Care Coordinator, Fili Pedro, with any questions, at [phone].

## 2019-12-10 NOTE — PROGRESS NOTES
Pt is here for follow up for injections. TRANSFORAMINAL EPIDURAL - LUMBAR    Per pt back felt better from injection for two days of relief. Symptoms of numbness and tingling still the same as last office visit in the bilateral legs.  No new symptoms to

## 2019-12-10 NOTE — PATIENT INSTRUCTIONS
Refill policies:    • Allow 2-3 business days for refills; controlled substances may take longer.   • Contact your pharmacy at least 5 days prior to running out of medication and have them send an electronic request or submit request through the “request re Depending on your insurance carrier, approval may take 3-10 days. It is highly recommended patients contact their insurance carrier directly to determine coverage.   If test is done without insurance authorization, patient may be responsible for the entire not want a fusion.) Patient considering January after filter place. Orders entered  3. FU postop  4.  Patient is having a vascular filter first, plan on surgery after cleared by vascular    You are scheduled for LUMBAR LAMINECTOMY 2 LEVEL on 01/22/2020 with your body.  Wash from the neck down avoiding the genital (private) areas and concentrating on the surgical area  · Rinse off very thoroughly  · Dry yourself with a clean, dry towel  · Put on clean clothes  · Do not use any powders, creams, lotions or sprays to register at:  [phone].     Please park in the [facility], check in at registration and meet by the fish tank in the [employer] for your escort to class on the Ortho Spine unit.     If unable to attend, class is available online at [email or web address]. ee

## 2019-12-30 DIAGNOSIS — M48.04 THORACIC STENOSIS: ICD-10-CM

## 2019-12-31 RX ORDER — GABAPENTIN 100 MG/1
CAPSULE ORAL
Qty: 450 CAPSULE | Refills: 0 | Status: ON HOLD | OUTPATIENT
Start: 2019-12-31 | End: 2020-01-22

## 2020-01-03 NOTE — TELEPHONE ENCOUNTER
[facility] referral #[id number] initiated for coverage of L2-3 Laminectomy and L2-3, L3-4 Foraminotomies ([id number], [id number]), clinical notes and imaging reports attached directly to referral, pending review.

## 2020-01-15 NOTE — TELEPHONE ENCOUNTER
Per Dr. Mary Men on 01/07/20, \"Lumbar radiculopathy due to spinal stenosis-- medically stable and cleared for surgery\"

## 2020-01-16 ENCOUNTER — HOSPITAL ENCOUNTER (OUTPATIENT)
Dept: INTERVENTIONAL RADIOLOGY/VASCULAR | Facility: HOSPITAL | Age: 70
Discharge: HOME | End: 2020-01-16
Attending: SURGERY | Admitting: SURGERY
Payer: MEDICARE

## 2020-01-16 VITALS
WEIGHT: 215 LBS | RESPIRATION RATE: 16 BRPM | BODY MASS INDEX: 30.44 KG/M2 | HEIGHT: 70.5 IN | TEMPERATURE: 97 F | DIASTOLIC BLOOD PRESSURE: 54 MMHG | HEART RATE: 63 BPM | OXYGEN SATURATION: 94 % | SYSTOLIC BLOOD PRESSURE: 99 MMHG

## 2020-01-16 DIAGNOSIS — I82.522 CHRONIC DEEP VEIN THROMBOSIS (DVT) OF ILIAC VEIN OF LEFT LOWER EXTREMITY (HCC): ICD-10-CM

## 2020-01-16 PROCEDURE — 37191 INS ENDOVAS VENA CAVA FILTR: CPT

## 2020-01-16 PROCEDURE — 99153 MOD SED SAME PHYS/QHP EA: CPT

## 2020-01-16 PROCEDURE — 99152 MOD SED SAME PHYS/QHP 5/>YRS: CPT

## 2020-01-16 PROCEDURE — 06H03DZ INSERTION OF INTRALUMINAL DEVICE INTO INFERIOR VENA CAVA, PERCUTANEOUS APPROACH: ICD-10-PCS | Performed by: SURGERY

## 2020-01-16 RX ORDER — DIPHENHYDRAMINE HYDROCHLORIDE 50 MG/ML
50 INJECTION INTRAMUSCULAR; INTRAVENOUS ONCE AS NEEDED
Status: DISCONTINUED | OUTPATIENT
Start: 2020-01-16 | End: 2020-01-16

## 2020-01-16 RX ORDER — HYDROCODONE BITARTRATE AND ACETAMINOPHEN 5; 325 MG/1; MG/1
1 TABLET ORAL EVERY 4 HOURS PRN
Status: DISCONTINUED | OUTPATIENT
Start: 2020-01-16 | End: 2020-01-16

## 2020-01-16 RX ORDER — MORPHINE SULFATE 4 MG/ML
2 INJECTION, SOLUTION INTRAMUSCULAR; INTRAVENOUS EVERY 2 HOUR PRN
Status: DISCONTINUED | OUTPATIENT
Start: 2020-01-16 | End: 2020-01-16

## 2020-01-16 RX ORDER — ONDANSETRON 2 MG/ML
4 INJECTION INTRAMUSCULAR; INTRAVENOUS ONCE AS NEEDED
Status: DISCONTINUED | OUTPATIENT
Start: 2020-01-16 | End: 2020-01-16

## 2020-01-16 RX ORDER — ACETAMINOPHEN 325 MG/1
650 TABLET ORAL EVERY 6 HOURS PRN
Status: DISCONTINUED | OUTPATIENT
Start: 2020-01-16 | End: 2020-01-16

## 2020-01-16 RX ORDER — LIDOCAINE HYDROCHLORIDE 10 MG/ML
INJECTION, SOLUTION EPIDURAL; INFILTRATION; INTRACAUDAL; PERINEURAL
Status: COMPLETED
Start: 2020-01-16 | End: 2020-01-16

## 2020-01-16 RX ORDER — SODIUM CHLORIDE 9 MG/ML
INJECTION, SOLUTION INTRAVENOUS CONTINUOUS
Status: DISCONTINUED | OUTPATIENT
Start: 2020-01-16 | End: 2020-01-16

## 2020-01-16 RX ORDER — MIDAZOLAM HYDROCHLORIDE 1 MG/ML
INJECTION INTRAMUSCULAR; INTRAVENOUS
Status: COMPLETED
Start: 2020-01-16 | End: 2020-01-16

## 2020-01-16 RX ORDER — HEPARIN SODIUM 5000 [USP'U]/ML
INJECTION, SOLUTION INTRAVENOUS; SUBCUTANEOUS
Status: COMPLETED
Start: 2020-01-16 | End: 2020-01-16

## 2020-01-16 RX ADMIN — SODIUM CHLORIDE: 9 INJECTION, SOLUTION INTRAVENOUS at 10:30:00

## 2020-01-16 NOTE — BRIEF OP NOTE
Pre-Operative Diagnosis: Deep vein thrombosis chronic with recurrent history x3 left leg     Post-Operative Diagnosis: Same     Procedure Performed:   1. Ultrasound-guided percutaneous access right internal jugular vein  2. Inferior venacavogram  3.   Dep

## 2020-01-16 NOTE — H&P
[facility]  Vascular Surgery Consultation    Franco Pemberton Patient Status:  Outpatient in a Bed    1950 MRN [id number]   Location [address] [facility] Attending Ramesh Martel MD   [facility] Day # 0 PCP Michelle Alicia MD reported that patient was flushed             when he was on Norco, can't eat and drink, can't             tolerate anything    Medications:    Current Facility-Administered Medications:   •  0.9% NaCl infusion, , Intravenous, Continuous    Review of Syste Cesilia Arteaga MD  1/16/2020  11:02 AM

## 2020-01-16 NOTE — PROGRESS NOTES
Pt post IVC filter placement via RIJ. Dressing to right neck c/d/i on arrival back to holding unit. VSS. Pt tolerating PO. Discharge instructions explained to pt & family, verbalized understanding.    Pt taken to [place] entrance via wheelchair w/ all belongi

## 2020-01-17 NOTE — TELEPHONE ENCOUNTER
Per Dr. Malcom Negron, [facility] 105 and Plan:  History of multiple DVTs– for temporary vena cava filter placement while patient undergoes spine surgery and is off anticoagulation. We will plan to remove filter 1 month after spine surgery. \"

## 2020-01-17 NOTE — PROCEDURES
[facility]    PATIENT'S NAME: Xochitl Ortez   ATTENDING PHYSICIAN: Marv Gallo M.D. OPERATING PHYSICIAN: Marv Gallo M.D.    PATIENT ACCOUNT#:   [de-identified]    LOCATION:  [place] [address] ED  MEDICAL RECORD #:   [id number] Glidewire into the inferior vena cava, exchanged the micropuncture catheter for a 5-Romansh sheath. I then advanced a Denison catheter down to the inferior vena cava and did a vena cavogram.  Vena cava was widely patent.   I then advanced an Amplatz wire into

## 2020-01-21 NOTE — TELEPHONE ENCOUNTER
Spoke to patient. Leobardo Vega He asked if insurance had been authorized for his procedure. Verified that it had indeed been. Also, patient asking about his length of his stay. Explained it will be a 23 hour or so observation.

## 2020-01-22 ENCOUNTER — ANESTHESIA EVENT (OUTPATIENT)
Dept: SURGERY | Facility: HOSPITAL | Age: 70
End: 2020-01-22
Payer: MEDICARE

## 2020-01-22 ENCOUNTER — HOSPITAL ENCOUNTER (OUTPATIENT)
Facility: HOSPITAL | Age: 70
Discharge: HOME | End: 2020-01-22
Attending: NEUROLOGICAL SURGERY | Admitting: NEUROLOGICAL SURGERY
Payer: MEDICARE

## 2020-01-22 ENCOUNTER — ANESTHESIA (OUTPATIENT)
Dept: SURGERY | Facility: HOSPITAL | Age: 70
End: 2020-01-22
Payer: MEDICARE

## 2020-01-22 ENCOUNTER — APPOINTMENT (OUTPATIENT)
Dept: GENERAL RADIOLOGY | Facility: HOSPITAL | Age: 70
End: 2020-01-22
Attending: NEUROLOGICAL SURGERY
Payer: MEDICARE

## 2020-01-22 VITALS
OXYGEN SATURATION: 98 % | RESPIRATION RATE: 16 BRPM | DIASTOLIC BLOOD PRESSURE: 66 MMHG | SYSTOLIC BLOOD PRESSURE: 107 MMHG | TEMPERATURE: 98 F | WEIGHT: 215.63 LBS | HEART RATE: 54 BPM | BODY MASS INDEX: 30.19 KG/M2 | HEIGHT: 71 IN

## 2020-01-22 DIAGNOSIS — M48.061 SPINAL STENOSIS OF LUMBAR REGION, UNSPECIFIED WHETHER NEUROGENIC CLAUDICATION PRESENT: ICD-10-CM

## 2020-01-22 PROCEDURE — 76000 FLUOROSCOPY <1 HR PHYS/QHP: CPT | Performed by: NEUROLOGICAL SURGERY

## 2020-01-22 PROCEDURE — 01NB0ZZ RELEASE LUMBAR NERVE, OPEN APPROACH: ICD-10-PCS | Performed by: NEUROLOGICAL SURGERY

## 2020-01-22 RX ORDER — CYCLOBENZAPRINE HCL 10 MG
10 TABLET ORAL 3 TIMES DAILY PRN
Status: DISCONTINUED | OUTPATIENT
Start: 2020-01-22 | End: 2020-01-22

## 2020-01-22 RX ORDER — ONDANSETRON 2 MG/ML
4 INJECTION INTRAMUSCULAR; INTRAVENOUS AS NEEDED
Status: DISCONTINUED | OUTPATIENT
Start: 2020-01-22 | End: 2020-01-22 | Stop reason: HOSPADM

## 2020-01-22 RX ORDER — DIPHENHYDRAMINE HYDROCHLORIDE 50 MG/ML
25 INJECTION INTRAMUSCULAR; INTRAVENOUS EVERY 4 HOURS PRN
Status: DISCONTINUED | OUTPATIENT
Start: 2020-01-22 | End: 2020-01-22

## 2020-01-22 RX ORDER — TRAMADOL HYDROCHLORIDE 50 MG/1
50 TABLET ORAL ONCE AS NEEDED
Status: DISCONTINUED | OUTPATIENT
Start: 2020-01-22 | End: 2020-01-22 | Stop reason: HOSPADM

## 2020-01-22 RX ORDER — GABAPENTIN 100 MG/1
200 CAPSULE ORAL EVERY MORNING
COMMUNITY
End: 2020-03-31

## 2020-01-22 RX ORDER — METOCLOPRAMIDE HYDROCHLORIDE 5 MG/ML
10 INJECTION INTRAMUSCULAR; INTRAVENOUS AS NEEDED
Status: DISCONTINUED | OUTPATIENT
Start: 2020-01-22 | End: 2020-01-22 | Stop reason: HOSPADM

## 2020-01-22 RX ORDER — MIDAZOLAM HYDROCHLORIDE 1 MG/ML
1 INJECTION INTRAMUSCULAR; INTRAVENOUS EVERY 5 MIN PRN
Status: DISCONTINUED | OUTPATIENT
Start: 2020-01-22 | End: 2020-01-22 | Stop reason: HOSPADM

## 2020-01-22 RX ORDER — ROCURONIUM BROMIDE 10 MG/ML
INJECTION, SOLUTION INTRAVENOUS AS NEEDED
Status: DISCONTINUED | OUTPATIENT
Start: 2020-01-22 | End: 2020-01-22 | Stop reason: SURG

## 2020-01-22 RX ORDER — NEOSTIGMINE METHYLSULFATE 1 MG/ML
INJECTION INTRAVENOUS AS NEEDED
Status: DISCONTINUED | OUTPATIENT
Start: 2020-01-22 | End: 2020-01-22 | Stop reason: SURG

## 2020-01-22 RX ORDER — MULTIPLE VITAMINS W/ MINERALS TAB 9MG-400MCG
1 TAB ORAL DAILY
Status: DISCONTINUED | OUTPATIENT
Start: 2020-01-23 | End: 2020-01-22

## 2020-01-22 RX ORDER — ONDANSETRON 2 MG/ML
INJECTION INTRAMUSCULAR; INTRAVENOUS AS NEEDED
Status: DISCONTINUED | OUTPATIENT
Start: 2020-01-22 | End: 2020-01-22 | Stop reason: SURG

## 2020-01-22 RX ORDER — CEFAZOLIN SODIUM/WATER 2 G/20 ML
2 SYRINGE (ML) INTRAVENOUS ONCE
Status: COMPLETED | OUTPATIENT
Start: 2020-01-22 | End: 2020-01-22

## 2020-01-22 RX ORDER — ACETAMINOPHEN 325 MG/1
650 TABLET ORAL EVERY 4 HOURS PRN
Status: DISCONTINUED | OUTPATIENT
Start: 2020-01-22 | End: 2020-01-22

## 2020-01-22 RX ORDER — LABETALOL HYDROCHLORIDE 5 MG/ML
5 INJECTION, SOLUTION INTRAVENOUS EVERY 5 MIN PRN
Status: DISCONTINUED | OUTPATIENT
Start: 2020-01-22 | End: 2020-01-22 | Stop reason: HOSPADM

## 2020-01-22 RX ORDER — DIPHENHYDRAMINE HCL 25 MG
25 CAPSULE ORAL EVERY 4 HOURS PRN
Status: DISCONTINUED | OUTPATIENT
Start: 2020-01-22 | End: 2020-01-22

## 2020-01-22 RX ORDER — HYDROMORPHONE HYDROCHLORIDE 1 MG/ML
0.4 INJECTION, SOLUTION INTRAMUSCULAR; INTRAVENOUS; SUBCUTANEOUS EVERY 5 MIN PRN
Status: DISCONTINUED | OUTPATIENT
Start: 2020-01-22 | End: 2020-01-22 | Stop reason: HOSPADM

## 2020-01-22 RX ORDER — TRAMADOL HYDROCHLORIDE 50 MG/1
TABLET ORAL EVERY 4 HOURS PRN
Qty: 60 TABLET | Refills: 0 | Status: SHIPPED | OUTPATIENT
Start: 2020-01-22 | End: 2020-02-07 | Stop reason: ALTCHOICE

## 2020-01-22 RX ORDER — DEXAMETHASONE SODIUM PHOSPHATE 4 MG/ML
VIAL (ML) INJECTION AS NEEDED
Status: DISCONTINUED | OUTPATIENT
Start: 2020-01-22 | End: 2020-01-22 | Stop reason: SURG

## 2020-01-22 RX ORDER — ACETAMINOPHEN 500 MG
1000 TABLET ORAL ONCE
Status: DISCONTINUED | OUTPATIENT
Start: 2020-01-22 | End: 2020-01-22 | Stop reason: HOSPADM

## 2020-01-22 RX ORDER — BACITRACIN 50000 [USP'U]/1
INJECTION, POWDER, LYOPHILIZED, FOR SOLUTION INTRAMUSCULAR AS NEEDED
Status: DISCONTINUED | OUTPATIENT
Start: 2020-01-22 | End: 2020-01-22 | Stop reason: HOSPADM

## 2020-01-22 RX ORDER — ACETAMINOPHEN 160 MG/5ML
650 SOLUTION ORAL EVERY 4 HOURS PRN
Status: DISCONTINUED | OUTPATIENT
Start: 2020-01-22 | End: 2020-01-22

## 2020-01-22 RX ORDER — LIDOCAINE HYDROCHLORIDE 10 MG/ML
INJECTION, SOLUTION EPIDURAL; INFILTRATION; INTRACAUDAL; PERINEURAL AS NEEDED
Status: DISCONTINUED | OUTPATIENT
Start: 2020-01-22 | End: 2020-01-22 | Stop reason: SURG

## 2020-01-22 RX ORDER — DIPHENHYDRAMINE HYDROCHLORIDE 50 MG/ML
12.5 INJECTION INTRAMUSCULAR; INTRAVENOUS AS NEEDED
Status: DISCONTINUED | OUTPATIENT
Start: 2020-01-22 | End: 2020-01-22 | Stop reason: HOSPADM

## 2020-01-22 RX ORDER — MORPHINE SULFATE 4 MG/ML
2 INJECTION, SOLUTION INTRAMUSCULAR; INTRAVENOUS EVERY 2 HOUR PRN
Status: DISCONTINUED | OUTPATIENT
Start: 2020-01-22 | End: 2020-01-22

## 2020-01-22 RX ORDER — SODIUM CHLORIDE AND POTASSIUM CHLORIDE .9; .15 G/100ML; G/100ML
75 SOLUTION INTRAVENOUS CONTINUOUS
Status: DISCONTINUED | OUTPATIENT
Start: 2020-01-22 | End: 2020-01-22

## 2020-01-22 RX ORDER — GABAPENTIN 300 MG/1
300 CAPSULE ORAL NIGHTLY
Status: DISCONTINUED | OUTPATIENT
Start: 2020-01-22 | End: 2020-01-22

## 2020-01-22 RX ORDER — MEPERIDINE HYDROCHLORIDE 25 MG/ML
12.5 INJECTION INTRAMUSCULAR; INTRAVENOUS; SUBCUTANEOUS AS NEEDED
Status: DISCONTINUED | OUTPATIENT
Start: 2020-01-22 | End: 2020-01-22 | Stop reason: HOSPADM

## 2020-01-22 RX ORDER — GABAPENTIN 100 MG/1
300 CAPSULE ORAL NIGHTLY
COMMUNITY
End: 2020-08-12

## 2020-01-22 RX ORDER — MIDAZOLAM HYDROCHLORIDE 1 MG/ML
INJECTION INTRAMUSCULAR; INTRAVENOUS
Status: DISCONTINUED
Start: 2020-01-22 | End: 2020-01-22 | Stop reason: WASHOUT

## 2020-01-22 RX ORDER — LISINOPRIL 5 MG/1
5 TABLET ORAL DAILY
Status: DISCONTINUED | OUTPATIENT
Start: 2020-01-23 | End: 2020-01-22

## 2020-01-22 RX ORDER — MORPHINE SULFATE 4 MG/ML
4 INJECTION, SOLUTION INTRAMUSCULAR; INTRAVENOUS EVERY 2 HOUR PRN
Status: DISCONTINUED | OUTPATIENT
Start: 2020-01-22 | End: 2020-01-22

## 2020-01-22 RX ORDER — SODIUM PHOSPHATE, DIBASIC AND SODIUM PHOSPHATE, MONOBASIC 7; 19 G/133ML; G/133ML
1 ENEMA RECTAL ONCE AS NEEDED
Status: DISCONTINUED | OUTPATIENT
Start: 2020-01-22 | End: 2020-01-22

## 2020-01-22 RX ORDER — ONDANSETRON HYDROCHLORIDE 8 MG/1
8 TABLET, FILM COATED ORAL EVERY 8 HOURS PRN
Qty: 45 TABLET | Refills: 0 | Status: SHIPPED | OUTPATIENT
Start: 2020-01-22 | End: 2020-02-07 | Stop reason: ALTCHOICE

## 2020-01-22 RX ORDER — CEFAZOLIN SODIUM/WATER 2 G/20 ML
2 SYRINGE (ML) INTRAVENOUS EVERY 8 HOURS
Status: DISCONTINUED | OUTPATIENT
Start: 2020-01-22 | End: 2020-01-22

## 2020-01-22 RX ORDER — SODIUM CHLORIDE, SODIUM LACTATE, POTASSIUM CHLORIDE, CALCIUM CHLORIDE 600; 310; 30; 20 MG/100ML; MG/100ML; MG/100ML; MG/100ML
INJECTION, SOLUTION INTRAVENOUS CONTINUOUS
Status: DISCONTINUED | OUTPATIENT
Start: 2020-01-22 | End: 2020-01-22 | Stop reason: HOSPADM

## 2020-01-22 RX ORDER — POLYETHYLENE GLYCOL 3350 17 G/17G
17 POWDER, FOR SOLUTION ORAL DAILY PRN
Status: DISCONTINUED | OUTPATIENT
Start: 2020-01-22 | End: 2020-01-22

## 2020-01-22 RX ORDER — NALOXONE HYDROCHLORIDE 0.4 MG/ML
80 INJECTION, SOLUTION INTRAMUSCULAR; INTRAVENOUS; SUBCUTANEOUS AS NEEDED
Status: DISCONTINUED | OUTPATIENT
Start: 2020-01-22 | End: 2020-01-22 | Stop reason: HOSPADM

## 2020-01-22 RX ORDER — DOCUSATE SODIUM 100 MG/1
100 CAPSULE, LIQUID FILLED ORAL 2 TIMES DAILY
Status: DISCONTINUED | OUTPATIENT
Start: 2020-01-22 | End: 2020-01-22

## 2020-01-22 RX ORDER — DOCUSATE SODIUM 100 MG/1
100 CAPSULE, LIQUID FILLED ORAL 2 TIMES DAILY
Qty: 60 CAPSULE | Refills: 2 | Status: SHIPPED | OUTPATIENT
Start: 2020-01-22 | End: 2020-02-07 | Stop reason: ALTCHOICE

## 2020-01-22 RX ORDER — TRAMADOL HYDROCHLORIDE 50 MG/1
TABLET ORAL EVERY 6 HOURS PRN
Status: DISCONTINUED | OUTPATIENT
Start: 2020-01-22 | End: 2020-01-22

## 2020-01-22 RX ORDER — BISACODYL 10 MG
10 SUPPOSITORY, RECTAL RECTAL
Status: DISCONTINUED | OUTPATIENT
Start: 2020-01-22 | End: 2020-01-22

## 2020-01-22 RX ORDER — BUPIVACAINE HYDROCHLORIDE AND EPINEPHRINE 2.5; 5 MG/ML; UG/ML
INJECTION, SOLUTION EPIDURAL; INFILTRATION; INTRACAUDAL; PERINEURAL AS NEEDED
Status: DISCONTINUED | OUTPATIENT
Start: 2020-01-22 | End: 2020-01-22 | Stop reason: HOSPADM

## 2020-01-22 RX ORDER — ONDANSETRON 2 MG/ML
4 INJECTION INTRAMUSCULAR; INTRAVENOUS EVERY 4 HOURS PRN
Status: DISCONTINUED | OUTPATIENT
Start: 2020-01-22 | End: 2020-01-22

## 2020-01-22 RX ORDER — CEFAZOLIN SODIUM/WATER 2 G/20 ML
SYRINGE (ML) INTRAVENOUS
Status: DISPENSED
Start: 2020-01-22 | End: 2020-01-22

## 2020-01-22 RX ORDER — ACETAMINOPHEN 500 MG
1000 TABLET ORAL ONCE AS NEEDED
Status: DISCONTINUED | OUTPATIENT
Start: 2020-01-22 | End: 2020-01-22 | Stop reason: HOSPADM

## 2020-01-22 RX ORDER — GABAPENTIN 100 MG/1
200 CAPSULE ORAL EVERY MORNING
Status: DISCONTINUED | OUTPATIENT
Start: 2020-01-23 | End: 2020-01-22

## 2020-01-22 RX ORDER — GLYCOPYRROLATE 0.2 MG/ML
INJECTION, SOLUTION INTRAMUSCULAR; INTRAVENOUS AS NEEDED
Status: DISCONTINUED | OUTPATIENT
Start: 2020-01-22 | End: 2020-01-22 | Stop reason: SURG

## 2020-01-22 RX ORDER — LISINOPRIL 5 MG/1
5 TABLET ORAL DAILY
COMMUNITY
End: 2020-12-04

## 2020-01-22 RX ORDER — MORPHINE SULFATE 4 MG/ML
1 INJECTION, SOLUTION INTRAMUSCULAR; INTRAVENOUS EVERY 2 HOUR PRN
Status: DISCONTINUED | OUTPATIENT
Start: 2020-01-22 | End: 2020-01-22

## 2020-01-22 RX ORDER — KETAMINE HYDROCHLORIDE 50 MG/ML
INJECTION, SOLUTION, CONCENTRATE INTRAMUSCULAR; INTRAVENOUS AS NEEDED
Status: DISCONTINUED | OUTPATIENT
Start: 2020-01-22 | End: 2020-01-22 | Stop reason: SURG

## 2020-01-22 RX ORDER — CYCLOBENZAPRINE HCL 10 MG
10 TABLET ORAL 3 TIMES DAILY PRN
Qty: 60 TABLET | Refills: 2 | Status: SHIPPED | OUTPATIENT
Start: 2020-01-22 | End: 2020-02-07 | Stop reason: ALTCHOICE

## 2020-01-22 RX ADMIN — DEXAMETHASONE SODIUM PHOSPHATE 8 MG: 4 MG/ML VIAL (ML) INJECTION at 08:25:00

## 2020-01-22 RX ADMIN — LIDOCAINE HYDROCHLORIDE 100 MG: 10 INJECTION, SOLUTION EPIDURAL; INFILTRATION; INTRACAUDAL; PERINEURAL at 08:25:00

## 2020-01-22 RX ADMIN — ROCURONIUM BROMIDE 50 MG: 10 INJECTION, SOLUTION INTRAVENOUS at 08:25:00

## 2020-01-22 RX ADMIN — ROCURONIUM BROMIDE 10 MG: 10 INJECTION, SOLUTION INTRAVENOUS at 09:54:00

## 2020-01-22 RX ADMIN — SODIUM CHLORIDE, SODIUM LACTATE, POTASSIUM CHLORIDE, CALCIUM CHLORIDE: 600; 310; 30; 20 INJECTION, SOLUTION INTRAVENOUS at 09:24:00

## 2020-01-22 RX ADMIN — GLYCOPYRROLATE 0.8 MG: 0.2 INJECTION, SOLUTION INTRAMUSCULAR; INTRAVENOUS at 10:21:00

## 2020-01-22 RX ADMIN — CEFAZOLIN SODIUM/WATER 2 G: 2 G/20 ML SYRINGE (ML) INTRAVENOUS at 08:40:00

## 2020-01-22 RX ADMIN — ONDANSETRON 4 MG: 2 INJECTION INTRAMUSCULAR; INTRAVENOUS at 10:15:00

## 2020-01-22 RX ADMIN — NEOSTIGMINE METHYLSULFATE 5 MG: 1 INJECTION INTRAVENOUS at 10:21:00

## 2020-01-22 RX ADMIN — KETAMINE HYDROCHLORIDE 50 MG: 50 INJECTION, SOLUTION, CONCENTRATE INTRAMUSCULAR; INTRAVENOUS at 08:25:00

## 2020-01-22 NOTE — H&P
Lamont Prescott PCP:  Tino Walker MD    1950 MRN [id number]      HISTORY OF PRESENT ILLNESS:  Lamont Prescott is a(n) 68year old male did not helped anterior thighs. Anterior thighs burn L4 blateral. LBP was normal for 2 days.  Legs or symptoms, and the risk of death. The patient wishes to pursue surgical intervention.       Pt seen and examined. No changes since last visit. All questions answered. Pt ready for surgery.

## 2020-01-22 NOTE — OPERATIVE REPORT
Operative Note    Patient Name: Jesusita Carrington    Preoperative Diagnosis: Spinal stenosis of lumbar region, unspecified whether neurogenic claudication present [M48.061]    Postoperative Diagnosis: same    Primary Surgeon: Laith Uriostegui    Assistant: the probe would pass out freely. Then start on the right. Again we used Kerrison rongeurs and curettes to perform foraminotomies at 2 3 and 3 4. Once these are complete the probe would pass out freely. Hemostasis was achieved.   The dura was nicely deco

## 2020-01-22 NOTE — ANESTHESIA PROCEDURE NOTES
Airway  Date/Time: 1/22/2020 8:25 AM  Urgency: elective      General Information and Staff    Patient location during procedure: OR  Anesthesiologist: Delores Fernandes MD  Performed: anesthesiologist     Indications and Patient Condition  Indications for airw

## 2020-01-22 NOTE — ANESTHESIA PREPROCEDURE EVALUATION
PRE-OP EVALUATION    Patient Name: Jaymie Carrero    Pre-op Diagnosis: Spinal stenosis of lumbar region, unspecified whether neurogenic claudication present [M48.061]    Procedure(s):  LUMBAR 2-3 LAMINECTOMY.  LUMBAR 2-3  AND LUMBAR 3-4 BILATERAL FORAMINOT Cardiovascular  Comment: 1/2019 2D Echo:    Conclusions:     1. Left ventricle: The cavity size was normal. Wall thickness was normal.     Systolic function was normal. The estimated ejection fraction was 60-65%.      Features are consistent with a pseudo use: Yes      Alcohol/week: 0.0 standard drinks      Frequency: Monthly or less      Drinks per session: 1 or 2      Binge frequency: Never      Comment: social      Drug use: No     Available pre-op labs reviewed.   Lab Results   Component Value Date    WB

## 2020-01-22 NOTE — ANESTHESIA POSTPROCEDURE EVALUATION
[address] Patient Status:  Outpatient in a Bed   Age/Gender 68year old male MRN [id number]   [facility] SURGERY Attending Judith Palmer MD   Hosp Day # 0 PCP Danielle Mullen MD       Anesthesia Post-op Note

## 2020-01-23 NOTE — PLAN OF CARE
Patient up ambulated in [place]endez several times. Patient watched discharge video with family. Written and verbal discharge instructions given to patient and his family. Patient discharged home via w/c with his family.

## 2020-01-23 NOTE — PLAN OF CARE
Patient doing well, no numbness or tingling in lower extremities, only mild pain in incision. Patient taking tylenol for. Per surgeon APN okay for discharge if patient feeling good.

## 2020-01-28 PROBLEM — I82.4Z2 DEEP VEIN THROMBOSIS (DVT) OF DISTAL VEIN OF LEFT LOWER EXTREMITY, UNSPECIFIED CHRONICITY (HCC): Status: RESOLVED | Noted: 2019-01-17 | Resolved: 2020-01-28

## 2020-01-28 PROBLEM — I26.99 ACUTE PULMONARY EMBOLISM WITHOUT ACUTE COR PULMONALE (HCC): Status: RESOLVED | Noted: 2019-01-17 | Resolved: 2020-01-28

## 2020-02-07 ENCOUNTER — OFFICE VISIT (OUTPATIENT)
Dept: SURGERY | Facility: CLINIC | Age: 70
End: 2020-02-07
Payer: COMMERCIAL

## 2020-02-07 VITALS — SYSTOLIC BLOOD PRESSURE: 124 MMHG | HEART RATE: 60 BPM | DIASTOLIC BLOOD PRESSURE: 76 MMHG

## 2020-02-07 DIAGNOSIS — M54.16 LUMBAR RADICULOPATHY: ICD-10-CM

## 2020-02-07 DIAGNOSIS — M48.061 SPINAL STENOSIS OF LUMBAR REGION, UNSPECIFIED WHETHER NEUROGENIC CLAUDICATION PRESENT: Primary | ICD-10-CM

## 2020-02-07 DIAGNOSIS — M51.36 DDD (DEGENERATIVE DISC DISEASE), LUMBAR: ICD-10-CM

## 2020-02-07 PROCEDURE — 99024 POSTOP FOLLOW-UP VISIT: CPT | Performed by: PHYSICIAN ASSISTANT

## 2020-02-07 NOTE — PROGRESS NOTES
Pt is here for two week post op. LUMBAR 2-3 LAMINECTOMY. LUMBAR 2-3  AND LUMBAR 3-4 BILATERAL FORAMINOTOMIES 1/22/2020     Pt states he is feeling good.

## 2020-02-07 NOTE — PROGRESS NOTES
Neurosurgery Clinic Visit  2020    Yayo Nair PCP:  Nikita Gastelum MD    1950 MRN [id number]     HISTORY OF PRESENT ILLNESS:  Yayo Nair is a(n) 68year old male who is here 2 weeks s/p lumbar surgery.   He is feeling well and notic

## 2020-02-18 ENCOUNTER — HOSPITAL ENCOUNTER (OUTPATIENT)
Dept: INTERVENTIONAL RADIOLOGY/VASCULAR | Facility: HOSPITAL | Age: 70
Discharge: HOME | End: 2020-02-18
Attending: SURGERY | Admitting: SURGERY
Payer: MEDICARE

## 2020-02-18 VITALS
RESPIRATION RATE: 13 BRPM | OXYGEN SATURATION: 97 % | HEART RATE: 65 BPM | TEMPERATURE: 98 F | BODY MASS INDEX: 29.02 KG/M2 | HEIGHT: 70.5 IN | DIASTOLIC BLOOD PRESSURE: 63 MMHG | WEIGHT: 205 LBS | SYSTOLIC BLOOD PRESSURE: 106 MMHG

## 2020-02-18 DIAGNOSIS — I82.522 CHRONIC DEEP VEIN THROMBOSIS (DVT) OF ILIAC VEIN OF LEFT LOWER EXTREMITY (HCC): ICD-10-CM

## 2020-02-18 PROCEDURE — 99152 MOD SED SAME PHYS/QHP 5/>YRS: CPT

## 2020-02-18 PROCEDURE — 37193 REM ENDOVAS VENA CAVA FILTER: CPT

## 2020-02-18 PROCEDURE — 06PY3YZ REMOVAL OF OTHER DEVICE FROM LOWER VEIN, PERCUTANEOUS APPROACH: ICD-10-PCS | Performed by: SURGERY

## 2020-02-18 RX ORDER — HEPARIN SODIUM 5000 [USP'U]/ML
INJECTION, SOLUTION INTRAVENOUS; SUBCUTANEOUS
Status: COMPLETED
Start: 2020-02-18 | End: 2020-02-18

## 2020-02-18 RX ORDER — MIDAZOLAM HYDROCHLORIDE 1 MG/ML
INJECTION INTRAMUSCULAR; INTRAVENOUS
Status: COMPLETED
Start: 2020-02-18 | End: 2020-02-18

## 2020-02-18 RX ORDER — LIDOCAINE HYDROCHLORIDE 10 MG/ML
INJECTION, SOLUTION EPIDURAL; INFILTRATION; INTRACAUDAL; PERINEURAL
Status: COMPLETED
Start: 2020-02-18 | End: 2020-02-18

## 2020-02-18 RX ORDER — SODIUM CHLORIDE 9 MG/ML
INJECTION, SOLUTION INTRAVENOUS CONTINUOUS
Status: DISCONTINUED | OUTPATIENT
Start: 2020-02-18 | End: 2020-02-18

## 2020-02-18 RX ADMIN — SODIUM CHLORIDE: 9 INJECTION, SOLUTION INTRAVENOUS at 07:15:00

## 2020-02-18 NOTE — H&P
[facility]  Vascular Surgery Consultation    Vicenta Banda Patient Status:  Outpatient in a Bed    1950 MRN [id number]   Location [address] [facility] Attending Miley Funes MD   [facility] Day # 0 PCP Lord Odell MD current alcohol use. He reports that he does not use drugs.     Allergies:    Norco [Hydrocodone-*    NAUSEA AND VOMITING    Comment:Patient and wife reported that patient was flushed             when he was on Norco, can't eat and drink, can't patient.     Sherlyn Sosa MD  2/18/2020  8:22 AM

## 2020-02-18 NOTE — PROGRESS NOTES
Right neck dressing CDI, site is soft, no hematoma. Patient denies any pain. Discharge instructions reviewed with patient and family. Patient ambulating in room. IV D/C'd. Patient discharged by wheelchair to [facility]. Daughter is .

## 2020-02-18 NOTE — PROCEDURES
[facility]    PATIENT'S NAME: Natalia Argueta   ATTENDING PHYSICIAN: Keanu Davidson M.D. OPERATING PHYSICIAN: Keanu Davidson M.D.    PATIENT ACCOUNT#:   [de-identified]    LOCATION:  [address]  MEDICAL RECORD #:   [id number] filter. The vena cavogram was performed, demonstrating a patent vena cava and a patent filter. Using a snare, I engaged the hook of the filter with a snare and pulled the filter easily into the sheath. The filter was retrieved without complication.   We

## 2020-02-18 NOTE — BRIEF OP NOTE
Pre-Operative Diagnosis: chronic dvt left iliac vein     Post-Operative Diagnosis: same     Procedure Performed:   1. US perc access right IJ  2. Venacavagram  3.  Retrieval of venacava filter and repeat venacavagram    Anesthesia - moderate conscious sedat

## 2020-02-18 NOTE — PROGRESS NOTES
Received patient from cath lab s/p IVC filter removal. Right neck dressing CDI, area is soft, CDI, no hematoma. Patient denies any pain. VSS. Dr. Shireen Rangel @ bedside. Patient tolerating po intake. Will continue to monitor.

## 2020-02-19 ENCOUNTER — PATIENT MESSAGE (OUTPATIENT)
Dept: SURGERY | Facility: CLINIC | Age: 70
End: 2020-02-19

## 2020-02-19 DIAGNOSIS — M54.16 LUMBAR RADICULOPATHY: ICD-10-CM

## 2020-02-19 DIAGNOSIS — M51.36 DDD (DEGENERATIVE DISC DISEASE), LUMBAR: ICD-10-CM

## 2020-02-19 DIAGNOSIS — M48.061 SPINAL STENOSIS OF LUMBAR REGION, UNSPECIFIED WHETHER NEUROGENIC CLAUDICATION PRESENT: Primary | ICD-10-CM

## 2020-02-20 NOTE — TELEPHONE ENCOUNTER
From: Reji Winslow  To: Solomon Villeda PA-C  Sent: 2/19/2020 2:59 PM CST  Subject: Other    Hi Ivet,     Can I get a referral from Dr. Kenyatta Wright for the inversion table that we discussed at my last office visit.  I will need one for medicare, which cove

## 2020-02-24 PROBLEM — Z98.890 S/P LAMINECTOMY: Status: RESOLVED | Noted: 2018-01-18 | Resolved: 2020-02-24

## 2020-02-27 ENCOUNTER — PATIENT MESSAGE (OUTPATIENT)
Dept: SURGERY | Facility: CLINIC | Age: 70
End: 2020-02-27

## 2020-02-27 DIAGNOSIS — M51.36 DDD (DEGENERATIVE DISC DISEASE), LUMBAR: ICD-10-CM

## 2020-02-27 DIAGNOSIS — M54.16 LUMBAR RADICULOPATHY: Primary | ICD-10-CM

## 2020-02-27 DIAGNOSIS — M48.061 SPINAL STENOSIS OF LUMBAR REGION, UNSPECIFIED WHETHER NEUROGENIC CLAUDICATION PRESENT: ICD-10-CM

## 2020-02-28 NOTE — TELEPHONE ENCOUNTER
From: Avril Miller  To: David Jenkins PA-C  Sent: 2/27/2020 5:58 PM CST  Subject: Other    Hi Jaden,   Thank You for the order of February 20, 2020  Sorry to bother you again about the inversion table.  Medicare ([facility]) is a pain in t

## 2020-03-04 NOTE — TELEPHONE ENCOUNTER
Spoke to David parker at [facility] to request authorization for Inversion Table, states patient's plan is managed by [place] [address] at [phone].     Spoke to [employer] at [place] [address], [facility] authorization can be reviewed once referral is place

## 2020-03-05 ENCOUNTER — OFFICE VISIT (OUTPATIENT)
Dept: SURGERY | Facility: CLINIC | Age: 70
End: 2020-03-05
Payer: MEDICARE

## 2020-03-05 VITALS — DIASTOLIC BLOOD PRESSURE: 78 MMHG | SYSTOLIC BLOOD PRESSURE: 130 MMHG | HEART RATE: 72 BPM

## 2020-03-05 DIAGNOSIS — M54.16 LUMBAR RADICULOPATHY: ICD-10-CM

## 2020-03-05 DIAGNOSIS — M48.061 SPINAL STENOSIS OF LUMBAR REGION, UNSPECIFIED WHETHER NEUROGENIC CLAUDICATION PRESENT: Primary | ICD-10-CM

## 2020-03-05 PROCEDURE — 1111F DSCHRG MED/CURRENT MED MERGE: CPT | Performed by: NEUROLOGICAL SURGERY

## 2020-03-05 PROCEDURE — 99024 POSTOP FOLLOW-UP VISIT: CPT | Performed by: NEUROLOGICAL SURGERY

## 2020-03-05 NOTE — PROGRESS NOTES
Neurosurgery Clinic Visit  3/5/2020    Elmo Jean Baptiste PCP:  Aidan Romero MD    1950 MRN [id number]     HISTORY OF PRESENT ILLNESS:  Elmo Jean Baptiste is a(n) 65year old male status post lumbar laminectomies and foraminotomies  He is doing grea

## 2020-03-05 NOTE — TELEPHONE ENCOUNTER
Referral #[id number] updated to include [address], OV notes attached to referral, request pending review.

## 2020-03-31 RX ORDER — GABAPENTIN 100 MG/1
CAPSULE ORAL
Qty: 450 CAPSULE | Refills: 0 | Status: SHIPPED | OUTPATIENT
Start: 2020-03-31 | End: 2020-07-02

## 2020-07-02 RX ORDER — GABAPENTIN 100 MG/1
CAPSULE ORAL
Qty: 450 CAPSULE | Refills: 0 | Status: SHIPPED | OUTPATIENT
Start: 2020-07-02 | End: 2020-10-06

## 2020-07-18 PROCEDURE — 88305 TISSUE EXAM BY PATHOLOGIST: CPT | Performed by: INTERNAL MEDICINE

## 2020-08-24 NOTE — TELEPHONE ENCOUNTER
[facility]      CARDIOLOGY PROGRESS NOTE  RECS:    1. Acute CVA-  Neurology was consulted. MRI and echo have been ordered by medical team   2. Elevate troponin-likely demand ischemia in the setting acute CVA, PE and possible pneumonia-  Continue asa. Echo is pending   3. Elevated D-dimer- and Acute PE s/p IVC filter  4. COV-19. Not detected   5. Right sided pneumonia: Concern for aspiration- being managed by medical team s/p NGT  6. hypertension- elevated increase Norvasc. 7. Dysphagia- in the setting #1 has NGT  8. LBBB- old   5. Hypokalemia- being replaced. 10. Hypernatremia- on genlty hydration       Blood pressure is improving on current meds  Awaiting echo. Increase norvasc dose as needed  Will f/u peripherally  Call us as needed    ASSESSMENT:  Hospital Problems  Never Reviewed          Codes Class Noted POA    Cellulitis ICD-10-CM: L03.90  ICD-9-CM: 682.9  8/19/2020 Unknown        Elevated troponin I level ICD-10-CM: R79.89  ICD-9-CM: 790.6  8/19/2020 Unknown        Cerebrovascular accident (CVA) [facility]) ICD-10-CM: I63.9  ICD-9-CM: 434.91  8/19/2020 Unknown        CVA (cerebral vascular accident) [facility]) ICD-10-CM: I63.9  ICD-9-CM: 434.91  8/19/2020 Unknown                SUBJECTIVE:  No CP or SOB  Unable to communicate    OBJECTIVE:    VS:   Visit Vitals  /90   Pulse 82   Temp 97.8 °F (36.6 °C)   Resp 20   Ht 5' 3\" (1.6 m)   Wt 73.5 kg (162 lb)   SpO2 100%   BMI 28.70 kg/m²         Intake/Output Summary (Last 24 hours) at 8/24/2020 4875  Last data filed at 8/24/2020 0414  Gross per 24 hour   Intake 150 ml   Output 900 ml   Net -750 ml     TELE: normal sinus rhythm    General: alert and in no apparent distress. HENT: Normocephalic, atraumatic. Normal external eye.   Neck :  no JVD  Cardiac:  regular rate and rhythm, S1, S2 normal, no murmur, click, rub or gallop  Lungs: clear to auscultation bilaterally, diminished breath sounds R base, L base  Abdomen: Soft, Gabapentin refilled. nontender, no masses, NG tube  Neuro: right sided weakness. Extremities:  No c/c/e, peripheral pulses present      Labs: Results:       Chemistry Recent Labs     08/24/20  0514 08/23/20  0019 08/22/20  0322   * 133* 91   * 151* 145   K 3.8 3.1* 3.4*   * 117* 111   CO2 28 28 29   BUN 14 18 22*   CREA 0.76 0.94 1.20   CA 8.3* 8.1* 8.6   AGAP 4 6 5   BUCR 18 19 18      CBC w/Diff Recent Labs     08/24/20  0514 08/23/20  0019 08/22/20  0322   WBC 15.8* 17.4* 20.5*   RBC 4.41 4.89 4.85   HGB 12.3 13.4 13.7   HCT 38.9 42.3 42.1    208 197   GRANS 78* 83* 77*   LYMPH 11* 8* 12*   EOS 0 0 0      Cardiac Enzymes Recent Labs     08/22/20  0322   *      Coagulation Recent Labs     08/24/20  0514 08/23/20  0937   APTT 135.2* 95.5*       Lipid Panel Lab Results   Component Value Date/Time    Cholesterol, total 153 08/20/2020 08:12 AM    HDL Cholesterol 45 08/20/2020 08:12 AM    LDL, calculated 88.2 08/20/2020 08:12 AM    VLDL, calculated 19.8 08/20/2020 08:12 AM    Triglyceride 99 08/20/2020 08:12 AM    CHOL/HDL Ratio 3.4 08/20/2020 08:12 AM      BNP No results for input(s): BNPP in the last 72 hours. Liver Enzymes No results for input(s): TP, ALB, TBIL, AP in the last 72 hours.     No lab exists for component: SGOT, GPT, DBIL   Thyroid Studies No results found for: T4, T3U, TSH, TSHEXT, TSHEXT             Anju Oneill MD

## 2020-10-05 RX ORDER — GABAPENTIN 100 MG/1
CAPSULE ORAL
Qty: 450 CAPSULE | Refills: 0 | OUTPATIENT
Start: 2020-10-05

## 2020-10-05 NOTE — TELEPHONE ENCOUNTER
Called and spoke to [facility] pharmacy     Relayed message below to Pharmacy     Pharmacy verbally understood. Nothing further needed at this time     Medication denied thru Neurosurgery.

## 2020-10-05 NOTE — TELEPHONE ENCOUNTER
Medication Gabapentin 100 Mg has been prescribed by Pt's PCP. Pt was last seen in the office 3/5/2020     Per LOV Note Pt was advised to follow up PRN     Pt has no upcomming appointments with Dr Princess Joe.      Will call Pt's pharmacy for recommendation

## 2020-10-07 RX ORDER — GABAPENTIN 100 MG/1
CAPSULE ORAL
Qty: 450 CAPSULE | Refills: 0 | Status: SHIPPED | OUTPATIENT
Start: 2020-10-07 | End: 2020-12-04

## 2020-12-07 ENCOUNTER — LAB ENCOUNTER (OUTPATIENT)
Dept: LAB | Facility: HOSPITAL | Age: 70
End: 2020-12-07
Attending: SURGERY
Payer: MEDICARE

## 2020-12-07 DIAGNOSIS — M17.11 PRIMARY OSTEOARTHRITIS OF RIGHT KNEE: ICD-10-CM

## 2020-12-07 DIAGNOSIS — Z96.651 STATUS POST RIGHT KNEE REPLACEMENT: Primary | ICD-10-CM

## 2020-12-07 DIAGNOSIS — I82.523: ICD-10-CM

## 2020-12-07 PROCEDURE — 86850 RBC ANTIBODY SCREEN: CPT

## 2020-12-07 PROCEDURE — 86900 BLOOD TYPING SEROLOGIC ABO: CPT

## 2020-12-07 PROCEDURE — 86901 BLOOD TYPING SEROLOGIC RH(D): CPT

## 2020-12-08 RX ORDER — GABAPENTIN 100 MG/1
200 CAPSULE ORAL EVERY MORNING
Status: ON HOLD | COMMUNITY
End: 2020-12-15

## 2020-12-10 ENCOUNTER — HOSPITAL ENCOUNTER (OUTPATIENT)
Dept: INTERVENTIONAL RADIOLOGY/VASCULAR | Facility: HOSPITAL | Age: 70
Discharge: HOME | End: 2020-12-10
Attending: SURGERY | Admitting: SURGERY
Payer: MEDICARE

## 2020-12-10 VITALS
SYSTOLIC BLOOD PRESSURE: 141 MMHG | HEART RATE: 57 BPM | RESPIRATION RATE: 14 BRPM | DIASTOLIC BLOOD PRESSURE: 86 MMHG | HEIGHT: 71 IN | OXYGEN SATURATION: 100 % | WEIGHT: 210 LBS | BODY MASS INDEX: 29.4 KG/M2

## 2020-12-10 DIAGNOSIS — I82.523: Primary | ICD-10-CM

## 2020-12-10 DIAGNOSIS — I82.523 CHRONIC BILATERAL DEEP VEIN THROMBOSIS (DVT) OF ILIAC VEINS (HCC): ICD-10-CM

## 2020-12-10 PROCEDURE — 99152 MOD SED SAME PHYS/QHP 5/>YRS: CPT

## 2020-12-10 PROCEDURE — 37191 INS ENDOVAS VENA CAVA FILTR: CPT

## 2020-12-10 PROCEDURE — 06H03DZ INSERTION OF INTRALUMINAL DEVICE INTO INFERIOR VENA CAVA, PERCUTANEOUS APPROACH: ICD-10-PCS | Performed by: SURGERY

## 2020-12-10 RX ORDER — HEPARIN SODIUM 5000 [USP'U]/ML
INJECTION, SOLUTION INTRAVENOUS; SUBCUTANEOUS
Status: COMPLETED
Start: 2020-12-10 | End: 2020-12-10

## 2020-12-10 RX ORDER — MIDAZOLAM HYDROCHLORIDE 1 MG/ML
INJECTION INTRAMUSCULAR; INTRAVENOUS
Status: COMPLETED
Start: 2020-12-10 | End: 2020-12-10

## 2020-12-10 RX ORDER — LIDOCAINE HYDROCHLORIDE 10 MG/ML
INJECTION, SOLUTION EPIDURAL; INFILTRATION; INTRACAUDAL; PERINEURAL
Status: COMPLETED
Start: 2020-12-10 | End: 2020-12-10

## 2020-12-10 RX ORDER — SODIUM CHLORIDE 9 MG/ML
INJECTION, SOLUTION INTRAVENOUS CONTINUOUS
Status: DISCONTINUED | OUTPATIENT
Start: 2020-12-10 | End: 2020-12-10

## 2020-12-10 RX ADMIN — SODIUM CHLORIDE: 9 INJECTION, SOLUTION INTRAVENOUS at 13:00:00

## 2020-12-10 NOTE — BRIEF OP NOTE
Pre-Operative Diagnosis: recurrent dvt     Post-Operative Diagnosis: same     Procedure Performed:   1. US perc access right IJ  2. Inferior venacavagram  3. Placement of brianna filter    Assistant(s):        Surgical Findings:   1.  IVC patent pre and post

## 2020-12-10 NOTE — PROGRESS NOTES
Patient had IVC filter placed today with Dr. Benito Mason. Right IJ dressing with small amount of ooze on it. Site is soft. VSS. Daughter is @ bedside. Dr. Benito Mason @ bedside. Patient is tolerating po intake. Recovery time completed.  Patient ambulated to BR, chencho

## 2020-12-10 NOTE — H&P
[facility]  Orthopedic Surgery  HISTORY AND PHYSICAL EXAMINATION    Patient: [facility] Record Number: [id number]    CHIEF COMPLAINT: Right knee pain    HPI:   Pricilla Lob is a 65year old male followed in the office, struggle with disab [employer] 06/25/2018 9/1/2015   • Osteoarthritis     right knee    • Pulmonary embolism (HCC)     small one /not treated      Social History:  Social History    Tobacco Use      Smoking status: Never Smoker      Smokeless tobacco: Never Used    Alcohol use:  Solomon Soo total knee replacement. Limitations, risks, complications are discussed. Possibility of continued pain, stiffness, risk of compromise, incomplete resolution of symptoms is all discussed. He is well counseled, desires surgery. Clarisse Pruitt MD

## 2020-12-13 ENCOUNTER — LAB ENCOUNTER (OUTPATIENT)
Dept: LAB | Facility: HOSPITAL | Age: 70
End: 2020-12-13
Attending: ORTHOPAEDIC SURGERY
Payer: MEDICARE

## 2020-12-13 DIAGNOSIS — M17.11 PRIMARY OSTEOARTHRITIS OF RIGHT KNEE: ICD-10-CM

## 2020-12-14 ENCOUNTER — ANESTHESIA EVENT (OUTPATIENT)
Dept: SURGERY | Facility: HOSPITAL | Age: 70
End: 2020-12-14
Payer: MEDICARE

## 2020-12-15 ENCOUNTER — APPOINTMENT (OUTPATIENT)
Dept: GENERAL RADIOLOGY | Facility: HOSPITAL | Age: 70
End: 2020-12-15
Attending: ORTHOPAEDIC SURGERY
Payer: MEDICARE

## 2020-12-15 ENCOUNTER — HOSPITAL ENCOUNTER (OUTPATIENT)
Facility: HOSPITAL | Age: 70
Discharge: HOME HEALTH | End: 2020-12-16
Attending: ORTHOPAEDIC SURGERY | Admitting: ORTHOPAEDIC SURGERY
Payer: MEDICARE

## 2020-12-15 ENCOUNTER — ANESTHESIA (OUTPATIENT)
Dept: SURGERY | Facility: HOSPITAL | Age: 70
End: 2020-12-15
Payer: MEDICARE

## 2020-12-15 DIAGNOSIS — M17.11 PRIMARY OSTEOARTHRITIS OF RIGHT KNEE: Primary | ICD-10-CM

## 2020-12-15 LAB
CREAT BLD-MCNC: 1.34 MG/DL
INR BLD: 1 (ref 0.89–1.11)
PSA SERPL DL<=0.01 NG/ML-MCNC: 13.5 SECONDS (ref 12.4–14.6)

## 2020-12-15 PROCEDURE — 73560 X-RAY EXAM OF KNEE 1 OR 2: CPT | Performed by: ORTHOPAEDIC SURGERY

## 2020-12-15 PROCEDURE — 97116 GAIT TRAINING THERAPY: CPT

## 2020-12-15 PROCEDURE — 3E0T3BZ INTRODUCTION OF ANESTHETIC AGENT INTO PERIPHERAL NERVES AND PLEXI, PERCUTANEOUS APPROACH: ICD-10-PCS | Performed by: ANESTHESIOLOGY

## 2020-12-15 PROCEDURE — 76942 ECHO GUIDE FOR BIOPSY: CPT | Performed by: ANESTHESIOLOGY

## 2020-12-15 PROCEDURE — 0SRC0J9 REPLACEMENT OF RIGHT KNEE JOINT WITH SYNTHETIC SUBSTITUTE, CEMENTED, OPEN APPROACH: ICD-10-PCS | Performed by: ORTHOPAEDIC SURGERY

## 2020-12-15 PROCEDURE — 3E0T33Z INTRODUCTION OF ANTI-INFLAMMATORY INTO PERIPHERAL NERVES AND PLEXI, PERCUTANEOUS APPROACH: ICD-10-PCS | Performed by: ANESTHESIOLOGY

## 2020-12-15 PROCEDURE — 85610 PROTHROMBIN TIME: CPT

## 2020-12-15 PROCEDURE — 88311 DECALCIFY TISSUE: CPT | Performed by: ORTHOPAEDIC SURGERY

## 2020-12-15 PROCEDURE — 88305 TISSUE EXAM BY PATHOLOGIST: CPT | Performed by: ORTHOPAEDIC SURGERY

## 2020-12-15 PROCEDURE — 97161 PT EVAL LOW COMPLEX 20 MIN: CPT

## 2020-12-15 PROCEDURE — 82565 ASSAY OF CREATININE: CPT | Performed by: ORTHOPAEDIC SURGERY

## 2020-12-15 DEVICE — BIOMET BC R 1X40 US: Type: IMPLANTABLE DEVICE | Site: KNEE | Status: FUNCTIONAL

## 2020-12-15 DEVICE — PSN TIB STM 5 DEG SZ G R: Type: IMPLANTABLE DEVICE | Site: KNEE | Status: FUNCTIONAL

## 2020-12-15 DEVICE — PERSONA CM FM/CM TB/VE: Type: IMPLANTABLE DEVICE | Status: FUNCTIONAL

## 2020-12-15 DEVICE — IMPLANTABLE DEVICE: Type: IMPLANTABLE DEVICE | Site: KNEE | Status: FUNCTIONAL

## 2020-12-15 DEVICE — PSN FEM CR CMT CCR STD SZ12 R: Type: IMPLANTABLE DEVICE | Site: KNEE | Status: FUNCTIONAL

## 2020-12-15 DEVICE — PSN ALL POLY PAT PLY 38MM: Type: IMPLANTABLE DEVICE | Site: KNEE | Status: FUNCTIONAL

## 2020-12-15 RX ORDER — LISINOPRIL 5 MG/1
5 TABLET ORAL DAILY
Status: DISCONTINUED | OUTPATIENT
Start: 2020-12-16 | End: 2020-12-16

## 2020-12-15 RX ORDER — OXYCODONE HYDROCHLORIDE 15 MG/1
15 TABLET ORAL EVERY 4 HOURS PRN
Status: DISCONTINUED | OUTPATIENT
Start: 2020-12-15 | End: 2020-12-16

## 2020-12-15 RX ORDER — HYDROMORPHONE HYDROCHLORIDE 1 MG/ML
0.4 INJECTION, SOLUTION INTRAMUSCULAR; INTRAVENOUS; SUBCUTANEOUS EVERY 2 HOUR PRN
Status: DISCONTINUED | OUTPATIENT
Start: 2020-12-15 | End: 2020-12-16

## 2020-12-15 RX ORDER — TRANEXAMIC ACID 10 MG/ML
1000 INJECTION, SOLUTION INTRAVENOUS ONCE
Status: COMPLETED | OUTPATIENT
Start: 2020-12-15 | End: 2020-12-15

## 2020-12-15 RX ORDER — DIPHENHYDRAMINE HYDROCHLORIDE 50 MG/ML
12.5 INJECTION INTRAMUSCULAR; INTRAVENOUS EVERY 4 HOURS PRN
Status: DISCONTINUED | OUTPATIENT
Start: 2020-12-15 | End: 2020-12-16

## 2020-12-15 RX ORDER — GABAPENTIN 100 MG/1
300 CAPSULE ORAL NIGHTLY
COMMUNITY
End: 2021-01-18

## 2020-12-15 RX ORDER — HYDROMORPHONE HYDROCHLORIDE 1 MG/ML
0.4 INJECTION, SOLUTION INTRAMUSCULAR; INTRAVENOUS; SUBCUTANEOUS EVERY 5 MIN PRN
Status: DISCONTINUED | OUTPATIENT
Start: 2020-12-15 | End: 2020-12-15 | Stop reason: HOSPADM

## 2020-12-15 RX ORDER — NALOXONE HYDROCHLORIDE 0.4 MG/ML
80 INJECTION, SOLUTION INTRAMUSCULAR; INTRAVENOUS; SUBCUTANEOUS AS NEEDED
Status: DISCONTINUED | OUTPATIENT
Start: 2020-12-15 | End: 2020-12-15 | Stop reason: HOSPADM

## 2020-12-15 RX ORDER — OXYCODONE HYDROCHLORIDE 10 MG/1
10 TABLET ORAL EVERY 4 HOURS PRN
Status: DISCONTINUED | OUTPATIENT
Start: 2020-12-15 | End: 2020-12-16

## 2020-12-15 RX ORDER — ACETAMINOPHEN 325 MG/1
TABLET ORAL
Status: COMPLETED
Start: 2020-12-15 | End: 2020-12-15

## 2020-12-15 RX ORDER — CEFAZOLIN SODIUM/WATER 2 G/20 ML
2 SYRINGE (ML) INTRAVENOUS ONCE
Status: COMPLETED | OUTPATIENT
Start: 2020-12-15 | End: 2020-12-15

## 2020-12-15 RX ORDER — HYDROMORPHONE HYDROCHLORIDE 1 MG/ML
0.2 INJECTION, SOLUTION INTRAMUSCULAR; INTRAVENOUS; SUBCUTANEOUS EVERY 2 HOUR PRN
Status: DISCONTINUED | OUTPATIENT
Start: 2020-12-15 | End: 2020-12-16

## 2020-12-15 RX ORDER — DEXAMETHASONE SODIUM PHOSPHATE 10 MG/ML
8 INJECTION, SOLUTION INTRAMUSCULAR; INTRAVENOUS ONCE
Status: DISCONTINUED | OUTPATIENT
Start: 2020-12-16 | End: 2020-12-15

## 2020-12-15 RX ORDER — DOCUSATE SODIUM 100 MG/1
100 CAPSULE, LIQUID FILLED ORAL 2 TIMES DAILY
Status: DISCONTINUED | OUTPATIENT
Start: 2020-12-15 | End: 2020-12-16

## 2020-12-15 RX ORDER — MELATONIN
325
Status: DISCONTINUED | OUTPATIENT
Start: 2020-12-15 | End: 2020-12-16

## 2020-12-15 RX ORDER — SODIUM CHLORIDE, SODIUM LACTATE, POTASSIUM CHLORIDE, CALCIUM CHLORIDE 600; 310; 30; 20 MG/100ML; MG/100ML; MG/100ML; MG/100ML
INJECTION, SOLUTION INTRAVENOUS CONTINUOUS
Status: DISCONTINUED | OUTPATIENT
Start: 2020-12-15 | End: 2020-12-15 | Stop reason: HOSPADM

## 2020-12-15 RX ORDER — ONDANSETRON 2 MG/ML
4 INJECTION INTRAMUSCULAR; INTRAVENOUS EVERY 4 HOURS PRN
Status: DISCONTINUED | OUTPATIENT
Start: 2020-12-15 | End: 2020-12-16

## 2020-12-15 RX ORDER — ACETAMINOPHEN 500 MG
1000 TABLET ORAL 4 TIMES DAILY
Status: DISCONTINUED | OUTPATIENT
Start: 2020-12-15 | End: 2020-12-16

## 2020-12-15 RX ORDER — TRAMADOL HYDROCHLORIDE 50 MG/1
50 TABLET ORAL EVERY 12 HOURS
Status: DISCONTINUED | OUTPATIENT
Start: 2020-12-15 | End: 2020-12-16

## 2020-12-15 RX ORDER — GABAPENTIN 100 MG/1
200 CAPSULE ORAL EVERY MORNING
Status: DISCONTINUED | OUTPATIENT
Start: 2020-12-16 | End: 2020-12-16

## 2020-12-15 RX ORDER — TRANEXAMIC ACID 10 MG/ML
1000 INJECTION, SOLUTION INTRAVENOUS ONCE
Status: DISCONTINUED | OUTPATIENT
Start: 2020-12-15 | End: 2020-12-15 | Stop reason: HOSPADM

## 2020-12-15 RX ORDER — DIPHENHYDRAMINE HYDROCHLORIDE 50 MG/ML
25 INJECTION INTRAMUSCULAR; INTRAVENOUS ONCE AS NEEDED
Status: DISCONTINUED | OUTPATIENT
Start: 2020-12-15 | End: 2020-12-15

## 2020-12-15 RX ORDER — GABAPENTIN 100 MG/1
200 CAPSULE ORAL EVERY MORNING
COMMUNITY
End: 2021-01-18

## 2020-12-15 RX ORDER — SENNOSIDES 8.6 MG
17.2 TABLET ORAL NIGHTLY
Status: DISCONTINUED | OUTPATIENT
Start: 2020-12-15 | End: 2020-12-16

## 2020-12-15 RX ORDER — ASPIRIN 325 MG
325 TABLET ORAL 2 TIMES DAILY
Status: DISCONTINUED | OUTPATIENT
Start: 2020-12-15 | End: 2020-12-16

## 2020-12-15 RX ORDER — ONDANSETRON 2 MG/ML
INJECTION INTRAMUSCULAR; INTRAVENOUS AS NEEDED
Status: DISCONTINUED | OUTPATIENT
Start: 2020-12-15 | End: 2020-12-15 | Stop reason: SURG

## 2020-12-15 RX ORDER — ACETAMINOPHEN 325 MG/1
650 TABLET ORAL ONCE
Status: COMPLETED | OUTPATIENT
Start: 2020-12-15 | End: 2020-12-15

## 2020-12-15 RX ORDER — ACETAMINOPHEN 500 MG
1000 TABLET ORAL ONCE
Status: DISCONTINUED | OUTPATIENT
Start: 2020-12-15 | End: 2020-12-15

## 2020-12-15 RX ORDER — DEXAMETHASONE SODIUM PHOSPHATE 4 MG/ML
VIAL (ML) INJECTION AS NEEDED
Status: DISCONTINUED | OUTPATIENT
Start: 2020-12-15 | End: 2020-12-15 | Stop reason: SURG

## 2020-12-15 RX ORDER — MIDAZOLAM HYDROCHLORIDE 1 MG/ML
INJECTION INTRAMUSCULAR; INTRAVENOUS AS NEEDED
Status: DISCONTINUED | OUTPATIENT
Start: 2020-12-15 | End: 2020-12-15 | Stop reason: SURG

## 2020-12-15 RX ORDER — ONDANSETRON 2 MG/ML
4 INJECTION INTRAMUSCULAR; INTRAVENOUS AS NEEDED
Status: DISCONTINUED | OUTPATIENT
Start: 2020-12-15 | End: 2020-12-15 | Stop reason: HOSPADM

## 2020-12-15 RX ORDER — CEFAZOLIN SODIUM/WATER 2 G/20 ML
2 SYRINGE (ML) INTRAVENOUS EVERY 8 HOURS
Status: COMPLETED | OUTPATIENT
Start: 2020-12-15 | End: 2020-12-15

## 2020-12-15 RX ORDER — PROCHLORPERAZINE EDISYLATE 5 MG/ML
10 INJECTION INTRAMUSCULAR; INTRAVENOUS EVERY 6 HOURS PRN
Status: DISCONTINUED | OUTPATIENT
Start: 2020-12-15 | End: 2020-12-16

## 2020-12-15 RX ORDER — DIPHENHYDRAMINE HCL 25 MG
25 CAPSULE ORAL EVERY 4 HOURS PRN
Status: DISCONTINUED | OUTPATIENT
Start: 2020-12-15 | End: 2020-12-16

## 2020-12-15 RX ORDER — GABAPENTIN 300 MG/1
300 CAPSULE ORAL NIGHTLY
Status: DISCONTINUED | OUTPATIENT
Start: 2020-12-15 | End: 2020-12-16

## 2020-12-15 RX ORDER — OXYCODONE HYDROCHLORIDE 5 MG/1
5 TABLET ORAL EVERY 4 HOURS PRN
Status: DISCONTINUED | OUTPATIENT
Start: 2020-12-15 | End: 2020-12-16

## 2020-12-15 RX ORDER — SODIUM PHOSPHATE, DIBASIC AND SODIUM PHOSPHATE, MONOBASIC 7; 19 G/133ML; G/133ML
1 ENEMA RECTAL ONCE AS NEEDED
Status: DISCONTINUED | OUTPATIENT
Start: 2020-12-15 | End: 2020-12-16

## 2020-12-15 RX ORDER — ZOLPIDEM TARTRATE 5 MG/1
5 TABLET ORAL NIGHTLY PRN
Status: DISCONTINUED | OUTPATIENT
Start: 2020-12-15 | End: 2020-12-15

## 2020-12-15 RX ORDER — TRAMADOL HYDROCHLORIDE 50 MG/1
50 TABLET ORAL EVERY 6 HOURS
Status: DISCONTINUED | OUTPATIENT
Start: 2020-12-15 | End: 2020-12-15

## 2020-12-15 RX ORDER — TRANEXAMIC ACID 10 MG/ML
INJECTION, SOLUTION INTRAVENOUS
Status: COMPLETED
Start: 2020-12-15 | End: 2020-12-15

## 2020-12-15 RX ORDER — HYDROMORPHONE HYDROCHLORIDE 1 MG/ML
0.8 INJECTION, SOLUTION INTRAMUSCULAR; INTRAVENOUS; SUBCUTANEOUS EVERY 2 HOUR PRN
Status: DISCONTINUED | OUTPATIENT
Start: 2020-12-15 | End: 2020-12-16

## 2020-12-15 RX ORDER — CEFAZOLIN SODIUM/WATER 2 G/20 ML
SYRINGE (ML) INTRAVENOUS
Status: DISPENSED
Start: 2020-12-15 | End: 2020-12-15

## 2020-12-15 RX ORDER — LIDOCAINE HYDROCHLORIDE 10 MG/ML
INJECTION, SOLUTION EPIDURAL; INFILTRATION; INTRACAUDAL; PERINEURAL AS NEEDED
Status: DISCONTINUED | OUTPATIENT
Start: 2020-12-15 | End: 2020-12-15 | Stop reason: SURG

## 2020-12-15 RX ORDER — SODIUM CHLORIDE 9 MG/ML
INJECTION, SOLUTION INTRAVENOUS CONTINUOUS
Status: DISCONTINUED | OUTPATIENT
Start: 2020-12-15 | End: 2020-12-16

## 2020-12-15 RX ORDER — BISACODYL 10 MG
10 SUPPOSITORY, RECTAL RECTAL
Status: DISCONTINUED | OUTPATIENT
Start: 2020-12-15 | End: 2020-12-16

## 2020-12-15 RX ORDER — POLYETHYLENE GLYCOL 3350 17 G/17G
17 POWDER, FOR SOLUTION ORAL DAILY PRN
Status: DISCONTINUED | OUTPATIENT
Start: 2020-12-15 | End: 2020-12-16

## 2020-12-15 RX ORDER — KETOROLAC TROMETHAMINE 15 MG/ML
15 INJECTION, SOLUTION INTRAMUSCULAR; INTRAVENOUS EVERY 6 HOURS
Status: DISCONTINUED | OUTPATIENT
Start: 2020-12-15 | End: 2020-12-15

## 2020-12-15 RX ADMIN — LIDOCAINE HYDROCHLORIDE 25 MG: 10 INJECTION, SOLUTION EPIDURAL; INFILTRATION; INTRACAUDAL; PERINEURAL at 07:00:00

## 2020-12-15 RX ADMIN — DEXAMETHASONE SODIUM PHOSPHATE 4 MG: 4 MG/ML VIAL (ML) INJECTION at 07:16:00

## 2020-12-15 RX ADMIN — CEFAZOLIN SODIUM/WATER 2 G: 2 G/20 ML SYRINGE (ML) INTRAVENOUS at 07:07:00

## 2020-12-15 RX ADMIN — SODIUM CHLORIDE, SODIUM LACTATE, POTASSIUM CHLORIDE, CALCIUM CHLORIDE: 600; 310; 30; 20 INJECTION, SOLUTION INTRAVENOUS at 08:28:00

## 2020-12-15 RX ADMIN — ONDANSETRON 4 MG: 2 INJECTION INTRAMUSCULAR; INTRAVENOUS at 07:47:00

## 2020-12-15 RX ADMIN — SODIUM CHLORIDE, SODIUM LACTATE, POTASSIUM CHLORIDE, CALCIUM CHLORIDE: 600; 310; 30; 20 INJECTION, SOLUTION INTRAVENOUS at 06:57:00

## 2020-12-15 RX ADMIN — MIDAZOLAM HYDROCHLORIDE 2 MG: 1 INJECTION INTRAMUSCULAR; INTRAVENOUS at 06:57:00

## 2020-12-15 RX ADMIN — DEXAMETHASONE SODIUM PHOSPHATE 2 MG: 4 MG/ML VIAL (ML) INJECTION at 07:05:00

## 2020-12-15 NOTE — HOME CARE LIAISON
MET WITH PTNT AND OFFERED CHOICE  OF AGENCIES. PTNT AGREEABLE TO [facility]. MET WITH PTNT TO DISCUSS HOME HEALTH SERVICES AND COVERAGE CRITERIA. PTNT AGREEABLE TO [facility]. PTNT GIVEN RESIDENTIAL BROCHURE.  RESIDENTIAL WITH PROVIDE SN/PT ON DISC

## 2020-12-15 NOTE — PHYSICAL THERAPY NOTE
PHYSICAL THERAPY KNEE EVALUATION - INPATIENT     Room Number: 378/378-A  Evaluation Date: 12/15/2020  Type of Evaluation: Initial  Physician Order: PT Eval and Treat    Presenting Problem: s/p right TKA 12/15/20  Reason for Therapy: Mobility Dysfunction an Bilateral 11/27/2018    Performed by Skyler Bañuelos MD at [address]  Type of Home: House   Home Layout: Two level; Able to live on main level  Stairs to Enter : 0     Stairs to [employer]: 13  Railing: Yes    Lives Wi bedside commode, etc.): A Little   -   Moving from lying on back to sitting on the side of the bed?: None   How much help from another person does the patient currently need. ..   -   Moving to and from a bed to a chair (including a wheelchair)?: A Little session/findings; All patient questions and concerns addressed;SCDs in place; Ice applied; Alarm set; Family present    ASSESSMENT   Patient is a 65year old male admitted on 12/15/2020 for elective right TKA.   Pertinent comorbidities and personal factors impa #5    AROM 0 degrees extension to 95 degrees flexion      Goal #6        Goal Comments: Goals established on 12/15/2020

## 2020-12-15 NOTE — ANESTHESIA POSTPROCEDURE EVALUATION
[address] Patient Status:  Outpatient in a Bed   Age/Gender 65year old male MRN [id number]   [facility] [place] SURGERY Attending Jerrod Steele MD   Hosp Day # 0 PCP Agustina Mc MD       Anesthesia Post-op Note    P

## 2020-12-15 NOTE — INTERVAL H&P NOTE
Pre-op Diagnosis: Primary osteoarthritis of right knee [M17.11]    The above referenced H&P was reviewed by SABIHA Hare on 12/15/2020, the patient was examined and no significant changes have occurred in the patient's condition since the H&P was pe

## 2020-12-15 NOTE — ANESTHESIA PREPROCEDURE EVALUATION
PRE-OP EVALUATION    Patient Name: Latonya Salgado    Pre-op Diagnosis: Primary osteoarthritis of right knee [M17.11]    Procedure(s):  RIGHT TOTAL KNEE ARTHROPLASTY    Surgeon(s) and Role:     * Suze Del Angel MD - Primary    Pre-op vitals reviewed.   Marek Sports 12/8/2020    •  celecoxib (CELEBREX) 200 MG Oral Cap, Take 1 capsule (200 mg total) by mouth daily. , Disp: 30 capsule, Rfl: 2, post o    •  docusate sodium (COLACE) 100 MG Oral Cap, Take 1 capsule (100 mg total) by mouth 2 (two) times daily. , Disp: 60 caps OR   • LUMBAR LAMINECTOMY 2 LEVEL N/A 1/22/2020    Performed by Miladis Partida MD at [facility] MAIN OR   • OTHER Right     eye muscle repair   • OTHER  01/22/2020    LUMBAR 2-3 LAMINECTOMY.  LUMBAR 2-3  AND LUMBAR 3-4 BILATERAL FORAMINOTOMIES    • OTHER SURG history. Pulmonary    Pulmonary exam normal.                 Other findings            ASA: 2   Plan: spinal  NPO status verified and patient meets guidelines. Post-procedure pain management plan discussed with surgeon and patient.   Surgeon requ

## 2020-12-15 NOTE — CONSULTS
[facility] Hospitalist Initial Consult       Reason for consult: Medical Management sp R TKA      History of Present Illness: Patient is a 65year old male with PMH sig for HTN, CKD3, DVT/PE, and peripheral neuropathy who presents sp R TKA.    He tolerated the pro muscle repair   • OTHER  01/22/2020    LUMBAR 2-3 LAMINECTOMY.  LUMBAR 2-3  AND LUMBAR 3-4 BILATERAL FORAMINOTOMIES    • OTHER SURGICAL HISTORY      Shoulder 2008   • OTHER SURGICAL HISTORY  2003    left knee meniscus   • OTHER SURGICAL HISTORY      eye rep 1.00       Recent Labs   Lab 12/15/20  1017   CREATSERUM 1.34*       No results for input(s): ALT, AST, ALB, AMYLASE, LIPASE, LDH in the last 168 hours. Invalid input(s): ALPHOS, TBIL, DBIL, TPROT    No results for input(s): PGLU in the last 168 hours.

## 2020-12-15 NOTE — OPERATIVE REPORT
[facility]    PATIENT'S NAME: Johanna Eber   ATTENDING PHYSICIAN: Yair Chang M.D. OPERATING PHYSICIAN: Yair Chang M.D.    PATIENT ACCOUNT#:   [de-identified]    LOCATION:  [address] [place] [address]  MEDICAL RECORD #:   [id number]       DATE OF BIRTH: balance is pursued. Meniscal remnants are removed. PSI tibia is placed. Proximal resection follows. Soft tissue balance is achieved with a 10 mm spacer block.   Size G tibia is placed, rotation is checked and double-checked, #12 femur is positioned, 10

## 2020-12-15 NOTE — CM/SW NOTE
12/15/20 1500   CM/SW Referral Data   Referral Source Social Work (self-referral)   Reason for Referral Discharge planning   Discharge Needs   Anticipated D/C needs Home health care       HOME SITUATION  Type of Home: House   Home Layout: Two level; Able

## 2020-12-15 NOTE — BRIEF OP NOTE
Pre-Operative Diagnosis: Primary osteoarthritis of right knee [M17.11]     Post-Operative Diagnosis: Primary osteoarthritis of right knee [M17.11]      Procedure Performed:   Procedure(s):  RIGHT TOTAL KNEE ARTHROPLASTY    Surgeon(s) and Role:     * Shaniqua

## 2020-12-15 NOTE — ANESTHESIA PROCEDURE NOTES
Regional Block  Performed by: Marcelo Saenz MD  Authorized by: Marcelo Saenz MD       General Information and Staff    Start Time:  12/15/2020 7:03 AM  End Time:  12/15/2020 7:05 AM  Anesthesiologist:  Marcelo Saenz MD  Performed by:   Anesthesiologis

## 2020-12-15 NOTE — PLAN OF CARE
Problem: Patient/Family Goals  Goal: Patient/Family Long Term Goal  Description: Patient's Long Term Goal: Pt will be able to go bike riding again    Interventions:  Pt will follow PT/OT recommendations at home & con't to increase activity.   - See additi

## 2020-12-15 NOTE — ANESTHESIA PROCEDURE NOTES
Spinal Block  Performed by: Mago Lovell MD  Authorized by: Mago Lovell MD       General Information and Staff    Start Time:  12/15/2020 7:00 AM  End Time:  12/15/2020 7:04 AM  Anesthesiologist:  Mago Lovell MD  Performed by:   Anesthesiologist

## 2020-12-16 VITALS
HEART RATE: 52 BPM | HEIGHT: 71 IN | DIASTOLIC BLOOD PRESSURE: 73 MMHG | TEMPERATURE: 97 F | RESPIRATION RATE: 16 BRPM | OXYGEN SATURATION: 94 % | WEIGHT: 213.63 LBS | SYSTOLIC BLOOD PRESSURE: 119 MMHG | BODY MASS INDEX: 29.91 KG/M2

## 2020-12-16 LAB
ANION GAP SERPL CALC-SCNC: 6 MMOL/L (ref 0–18)
BASOPHILS # BLD AUTO: 0.01 X10(3) UL (ref 0–0.2)
BASOPHILS NFR BLD AUTO: 0.1 %
BUN BLD-MCNC: 20 MG/DL (ref 7–18)
BUN/CREAT SERPL: 13.8 (ref 10–20)
CALCIUM BLD-MCNC: 8.7 MG/DL (ref 8.5–10.1)
CHLORIDE SERPL-SCNC: 107 MMOL/L (ref 98–112)
CO2 SERPL-SCNC: 24 MMOL/L (ref 21–32)
CREAT BLD-MCNC: 1.45 MG/DL
DEPRECATED RDW RBC AUTO: 45.1 FL (ref 35.1–46.3)
EOSINOPHIL # BLD AUTO: 0 X10(3) UL (ref 0–0.7)
EOSINOPHIL NFR BLD AUTO: 0 %
ERYTHROCYTE [DISTWIDTH] IN BLOOD BY AUTOMATED COUNT: 13.2 % (ref 11–15)
GLUCOSE BLD-MCNC: 151 MG/DL (ref 70–99)
HCT VFR BLD AUTO: 38.4 %
HGB BLD-MCNC: 13.3 G/DL
IMM GRANULOCYTES # BLD AUTO: 0.04 X10(3) UL (ref 0–1)
IMM GRANULOCYTES NFR BLD: 0.3 %
LYMPHOCYTES # BLD AUTO: 1.1 X10(3) UL (ref 1–4)
LYMPHOCYTES NFR BLD AUTO: 9.6 %
MCH RBC QN AUTO: 32.1 PG (ref 26–34)
MCHC RBC AUTO-ENTMCNC: 34.6 G/DL (ref 31–37)
MCV RBC AUTO: 92.8 FL
MONOCYTES # BLD AUTO: 0.59 X10(3) UL (ref 0.1–1)
MONOCYTES NFR BLD AUTO: 5.2 %
NEUTROPHILS # BLD AUTO: 9.7 X10 (3) UL (ref 1.5–7.7)
NEUTROPHILS # BLD AUTO: 9.7 X10(3) UL (ref 1.5–7.7)
NEUTROPHILS NFR BLD AUTO: 84.8 %
OSMOLALITY SERPL CALC.SUM OF ELEC: 290 MOSM/KG (ref 275–295)
PLATELET # BLD AUTO: 192 10(3)UL (ref 150–450)
POTASSIUM SERPL-SCNC: 4.6 MMOL/L (ref 3.5–5.1)
RBC # BLD AUTO: 4.14 X10(6)UL
SODIUM SERPL-SCNC: 137 MMOL/L (ref 136–145)
WBC # BLD AUTO: 11.4 X10(3) UL (ref 4–11)

## 2020-12-16 PROCEDURE — 85025 COMPLETE CBC W/AUTO DIFF WBC: CPT | Performed by: INTERNAL MEDICINE

## 2020-12-16 PROCEDURE — 80048 BASIC METABOLIC PNL TOTAL CA: CPT | Performed by: INTERNAL MEDICINE

## 2020-12-16 PROCEDURE — 97110 THERAPEUTIC EXERCISES: CPT

## 2020-12-16 PROCEDURE — 97165 OT EVAL LOW COMPLEX 30 MIN: CPT

## 2020-12-16 PROCEDURE — 97116 GAIT TRAINING THERAPY: CPT

## 2020-12-16 PROCEDURE — 97535 SELF CARE MNGMENT TRAINING: CPT

## 2020-12-16 RX ORDER — TRAMADOL HYDROCHLORIDE 50 MG/1
50 TABLET ORAL EVERY 12 HOURS
Qty: 20 TABLET | Refills: 0 | Status: SHIPPED | OUTPATIENT
Start: 2020-12-16 | End: 2020-12-17

## 2020-12-16 RX ORDER — CELECOXIB 200 MG/1
200 CAPSULE ORAL DAILY PRN
Qty: 30 CAPSULE | Refills: 2 | Status: SHIPPED | OUTPATIENT
Start: 2020-12-16 | End: 2021-01-18

## 2020-12-16 NOTE — PLAN OF CARE
Problem: Impaired Activities of Daily Living  Goal: Achieve highest/safest level of independence in self care  Description: Interventions:  - Assess ability and encourage patient to participate in ADLs to maximize function  - Promote sitting position [facility]

## 2020-12-16 NOTE — PHYSICAL THERAPY NOTE
PHYSICAL THERAPY TREATMENT NOTE - INPATIENT    Room Number: 378/378-A     Session: 1   Number of Visits to Meet Established Goals: 2    Presenting Problem: s/p right TKA 12/15/20    Problem List  Principal Problem:    Primary osteoarthritis of right knee RESTRICTION  Weight Bearing Restriction: R lower extremity        R Lower Extremity: Weight Bearing as Tolerated       PAIN ASSESSMENT   Ratin  Location: right knee  Management Techniques:  Activity promotion    BALANCE NT  Stand<>Sit: Mod I    Comments related to Mobility: Pt with good DF/PF, and able to perform SLR. No evidence of knee buckling in standing. Pt amb to<>from therapy gym with use of RW.   Pt with good heel/toe sequence, and cues for decreased lumbar flexi extension to 95 degrees flexion      Goal #6           Goal Comments: Goals established on 12/15/2020 - all goals achieved 12/16/2020     Current Goals adjusted/completed with approval from Supervising PT - 12/16/2020            Writer PPE: surgical mask,

## 2020-12-16 NOTE — PROGRESS NOTES
Daughter who is a physical therapist is at bedside. Reviewed indications, side effects of pain medication/narcotics and constipation prevention.  Stressed importance of increased fluids/roughage in diet, continued use of stool softeners along with laxatives

## 2020-12-16 NOTE — PROGRESS NOTES
[facility] Hospitalist Progress Note     PCP: Thao Allen MD    CC:  Follow up    SUBJECTIVE:   Pt sitting up in bed, pain manageable.  No cp/sob/n/v/f/c. +U, +flatus    OBJECTIVE:  Temp:  [97.2 °F (36.2 °C)-98 °F (36.7 °C)] 97.3 °F (36.3 °C)  Pulse:  [52- ondansetron HCl, Prochlorperazine Edisylate, diphenhydrAMINE **OR** diphenhydrAMINE HCl       Assessment/Plan:     Principal Problem:    Primary osteoarthritis of right knee      65 yr old male with PMH sig for HTN, DVT/PE, and peripheral neuropathy who pr

## 2020-12-16 NOTE — PROGRESS NOTES
Pt alert/oriented x 4. S/p R TKR on 12/15. Up in chair on days. [place] for discharge home today per Dr. Cyn Dominique and Dr. Jass Souza. Discussed discharge instructions with pt and daughter, discussed discharge medication list.  Verbalizes understanding.

## 2020-12-16 NOTE — PLAN OF CARE
VSS on RA. Tolerated CPM. SCD's on bilaterally. Gel ice in place. Normal sensation returning to RLE. Minimal pain managed by scheduled Tylenol and Tramadol. Voiding without difficulty. Up min assist w/ walker. Plan for d/c home later today with HH.  Reminde

## 2020-12-16 NOTE — OCCUPATIONAL THERAPY NOTE
OCCUPATIONAL THERAPY QUICK EVALUATION - INPATIENT    Room Number: 378/378-A  Evaluation Date: 12/16/2020     Type of Evaluation: Initial & Quick Eval  Presenting Problem: R TKR    Physician Order: IP Consult to Occupational Therapy  Reason for Therapy:  AD LUMBAR Bilateral 11/27/2018    Performed by Ryan Green MD at [address] [place] [address]  Type of Home: House  Home Layout: Two level; Able to live on main level  Lives With: Spouse;Son(dtr is a PT)    To supervision. Educated pt on safety during functional transfers. Pt completes transfer to commode over toilet with sueprvision using RW. Patient End of Session: With [facility] staff;Needs met; All patient questions and concerns addressed    ASSESSMENT     Chelita Diaz

## 2020-12-16 NOTE — PROGRESS NOTES
[facility] [address] [place] [address]  Orthopedic Surgery  Progress Note    Tara Mccartney Patient Status:  Outpatient in a Bed    1950 MRN [id number]   [facility] 3SW-A Attending Nav Mcgee MD   Hosp Day # 0 PCP Santino Carmichael MD      Donna Soto MD in 2 weeks      Sabra Baca MD  12/16/2020  7:06 AM

## 2020-12-20 ENCOUNTER — LAB REQUISITION (OUTPATIENT)
Dept: LAB | Facility: HOSPITAL | Age: 70
End: 2020-12-20
Payer: MEDICARE

## 2020-12-20 DIAGNOSIS — M17.11 UNILATERAL PRIMARY OSTEOARTHRITIS, RIGHT KNEE: ICD-10-CM

## 2020-12-20 DIAGNOSIS — Z47.1 AFTERCARE FOLLOWING JOINT REPLACEMENT SURGERY: ICD-10-CM

## 2020-12-20 PROCEDURE — 80048 BASIC METABOLIC PNL TOTAL CA: CPT | Performed by: INTERNAL MEDICINE

## 2020-12-24 NOTE — PROGRESS NOTES
Dr Carbajal Offer,  Do you mean kidney test instead of liver test since this was a BMP? Please advise. Thanks. Repeat lab still needs to be ordered- BMP in 1 month for decreased kidney function?

## 2020-12-24 NOTE — PROGRESS NOTES
Labs are improved. Liver tests are much  better than last checked but still slightly elevated. Cecile Mobley   Keep on same regimen-- recheck in 1 month

## 2020-12-29 NOTE — PROCEDURES
[facility]    PATIENT'S NAME: Ashley Sanchez   ATTENDING PHYSICIAN: Carito Cortez M.D. OPERATING PHYSICIAN: Carito Cortez M.D.    PATIENT ACCOUNT#:   [de-identified]    LOCATION:  [place] [address] EDW  MEDICAL RECORD #:   [id number] vena cavogram, and the left renal vein and right renal veins were easily identified with refluxing of contrast.  The sheath was adjusted appropriately, and the [facility] filter was advanced down to the level of the infrarenal vena cava and was deployed just b

## 2021-01-07 RX ORDER — GABAPENTIN 100 MG/1
CAPSULE ORAL
Qty: 450 CAPSULE | Refills: 0 | Status: SHIPPED | OUTPATIENT
Start: 2021-01-07 | End: 2021-04-06

## 2021-01-18 ENCOUNTER — LAB ENCOUNTER (OUTPATIENT)
Dept: LAB | Facility: HOSPITAL | Age: 71
End: 2021-01-18
Attending: SURGERY
Payer: MEDICARE

## 2021-01-18 VITALS — BODY MASS INDEX: 28.84 KG/M2 | WEIGHT: 206 LBS | HEIGHT: 71 IN

## 2021-01-18 DIAGNOSIS — I82.522: ICD-10-CM

## 2021-01-19 LAB — SARS-COV-2 RNA RESP QL NAA+PROBE: NOT DETECTED

## 2021-01-21 ENCOUNTER — HOSPITAL ENCOUNTER (OUTPATIENT)
Dept: INTERVENTIONAL RADIOLOGY/VASCULAR | Facility: HOSPITAL | Age: 71
Discharge: HOME | End: 2021-01-21
Attending: SURGERY
Payer: MEDICARE

## 2021-01-21 DIAGNOSIS — I82.522: Primary | ICD-10-CM

## 2021-01-23 ENCOUNTER — LAB ENCOUNTER (OUTPATIENT)
Dept: LAB | Facility: HOSPITAL | Age: 71
End: 2021-01-23
Attending: SURGERY
Payer: MEDICARE

## 2021-01-23 DIAGNOSIS — I82.522: ICD-10-CM

## 2021-01-24 LAB — SARS-COV-2 RNA RESP QL NAA+PROBE: NOT DETECTED

## 2021-01-26 ENCOUNTER — HOSPITAL ENCOUNTER (OUTPATIENT)
Dept: INTERVENTIONAL RADIOLOGY/VASCULAR | Facility: HOSPITAL | Age: 71
Setting detail: HOSPITAL OUTPATIENT SURGERY
Discharge: HOME | End: 2021-01-26
Attending: SURGERY | Admitting: SURGERY
Payer: MEDICARE

## 2021-01-26 VITALS
OXYGEN SATURATION: 100 % | DIASTOLIC BLOOD PRESSURE: 78 MMHG | HEIGHT: 71 IN | SYSTOLIC BLOOD PRESSURE: 119 MMHG | TEMPERATURE: 97 F | WEIGHT: 206 LBS | RESPIRATION RATE: 13 BRPM | HEART RATE: 71 BPM | BODY MASS INDEX: 28.84 KG/M2

## 2021-01-26 DIAGNOSIS — I82.522 CHRONIC DEEP VEIN THROMBOSIS (DVT) OF ILIAC VEIN OF LEFT LOWER EXTREMITY (HCC): ICD-10-CM

## 2021-01-26 DIAGNOSIS — I82.522: Primary | ICD-10-CM

## 2021-01-26 PROCEDURE — 37193 REM ENDOVAS VENA CAVA FILTER: CPT

## 2021-01-26 PROCEDURE — 99152 MOD SED SAME PHYS/QHP 5/>YRS: CPT

## 2021-01-26 PROCEDURE — 06PY3DZ REMOVAL OF INTRALUMINAL DEVICE FROM LOWER VEIN, PERCUTANEOUS APPROACH: ICD-10-PCS | Performed by: SURGERY

## 2021-01-26 RX ORDER — SODIUM CHLORIDE 9 MG/ML
INJECTION, SOLUTION INTRAVENOUS CONTINUOUS
Status: DISCONTINUED | OUTPATIENT
Start: 2021-01-26 | End: 2021-02-03

## 2021-01-26 RX ORDER — MIDAZOLAM HYDROCHLORIDE 1 MG/ML
INJECTION INTRAMUSCULAR; INTRAVENOUS
Status: COMPLETED
Start: 2021-01-26 | End: 2021-01-26

## 2021-01-26 RX ORDER — SODIUM CHLORIDE 9 MG/ML
INJECTION, SOLUTION INTRAVENOUS CONTINUOUS
Status: CANCELLED | OUTPATIENT
Start: 2021-01-26

## 2021-01-26 RX ORDER — LIDOCAINE HYDROCHLORIDE 10 MG/ML
INJECTION, SOLUTION EPIDURAL; INFILTRATION; INTRACAUDAL; PERINEURAL
Status: COMPLETED
Start: 2021-01-26 | End: 2021-01-26

## 2021-01-26 RX ORDER — HEPARIN SODIUM 5000 [USP'U]/ML
INJECTION, SOLUTION INTRAVENOUS; SUBCUTANEOUS
Status: COMPLETED
Start: 2021-01-26 | End: 2021-01-26

## 2021-01-26 NOTE — BRIEF OP NOTE
Pre-Operative Diagnosis: recurrent dvt     Post-Operative Diagnosis: same     Procedure Performed:   1. US perc access right internal jugular  2. Venacavagram  3.  Removal of vena cava filter and repeat venacavagram    Anesthesia - 3 versed 100 mcg fentanyl

## 2021-01-26 NOTE — PROGRESS NOTES
Discharge instructions reviewed w pt and spouse. Neck dressing changed for scant amt sang drainage. Site no longer oozing. Quick clot and tegaderm applied.  Pt discharged to home via wheelchair in stable condition

## 2021-01-30 NOTE — H&P
[facility]  Vascular Surgery Consultation    Roby Pablo Patient Status:  Hospital Outpatient Surgery    1950 MRN [id number]   Location [address] [facility] Attending Antionette Agudelo, [facility] Day # 0 PCP Chip Deems, MANAGEMENT     No family history on file. reports that he has never smoked. He has never used smokeless tobacco. He reports current alcohol use. He reports that he does not use drugs.     Allergies:    Nsaids                  RENAL INSUFFICIENCY    [facility]

## 2021-02-01 NOTE — PROCEDURES
[facility]    PATIENT'S NAME: Arabella Sims   ATTENDING PHYSICIAN: Taina Webb M.D. OPERATING PHYSICIAN: Taina Webb M.D.    PATIENT ACCOUNT#:   [de-identified]    LOCATION:  [address]  MEDICAL RECORD #:   [id number] sheath. From the side arm of the filter retrieval sheath, I did a venacavogram, demonstrating a patent vena cava with the filter in place. We then advanced the snare down, opened the snare.   I was able to easily hook the filter and advance it into the sh

## 2021-04-06 PROBLEM — G63 POLYNEUROPATHY IN DISEASES CLASSIFIED ELSEWHERE (HCC): Status: ACTIVE | Noted: 2021-04-06

## 2021-06-13 NOTE — INTERVAL H&P NOTE
Pre-op Diagnosis: Thoracic stenosis [M48.04]    The above referenced H&P was reviewed by Tayla Del Rio PA-C on 1/3/2018, the patient was examined and no significant changes have occurred in the patient's condition since the H&P was performed.   I discusse 70.3

## 2022-10-21 DIAGNOSIS — Z13.6 SCREENING, ISCHEMIC HEART DISEASE: Primary | ICD-10-CM

## 2022-10-28 ENCOUNTER — HOSPITAL ENCOUNTER (OUTPATIENT)
Dept: CT IMAGING | Age: 72
Discharge: HOME | End: 2022-10-28

## 2022-10-28 DIAGNOSIS — Z13.6 SCREENING, ISCHEMIC HEART DISEASE: ICD-10-CM

## 2022-10-28 PROCEDURE — 75571 CT HRT W/O DYE W/CA TEST: CPT

## 2022-11-02 ENCOUNTER — TELEPHONE (OUTPATIENT)
Dept: CARDIOLOGY | Age: 72
End: 2022-11-02

## 2023-10-23 ENCOUNTER — INCIDENTAL FINDING (OUTPATIENT)
Dept: GENERAL RADIOLOGY | Age: 73
End: 2023-10-23

## 2023-12-05 ENCOUNTER — INCIDENTAL FINDING (OUTPATIENT)
Dept: GENERAL RADIOLOGY | Age: 73
End: 2023-12-05

## (undated) DEVICE — PROXIMATE SKIN STAPLERS (35 WIDE) CONTAINS 35 STAINLESS STEEL STAPLES (FIXED HEAD): Brand: PROXIMATE

## (undated) DEVICE — NEEDLE SPINAL 18X3-1/2 PINK.

## (undated) DEVICE — STOCK ORTH TUB 72X8IN NLTX

## (undated) DEVICE — Device: Brand: PLUMEPEN

## (undated) DEVICE — DECANTER BAG 9": Brand: MEDLINE INDUSTRIES, INC.

## (undated) DEVICE — BANDAGE ROLL,100% COTTON, 6 PLY, LARGE: Brand: KERLIX

## (undated) DEVICE — ZIMMER® STERILE DISPOSABLE TOURNIQUET CUFF WITH PLC, DUAL PORT, SINGLE BLADDER, 34 IN. (86 CM)

## (undated) DEVICE — SUTURE VICRYL 2-0 CT-2

## (undated) DEVICE — DRESSING AQUACEL AG 3.5X12

## (undated) DEVICE — SOL  .9 1000ML BTL

## (undated) DEVICE — TISSEEL SPRAY SET

## (undated) DEVICE — KENDALL SCD EXPRESS SLEEVES, KNEE LENGTH, MEDIUM: Brand: KENDALL SCD

## (undated) DEVICE — Device

## (undated) DEVICE — TOTAL KNEE CDS: Brand: MEDLINE INDUSTRIES, INC.

## (undated) DEVICE — LIGHT HANDLE

## (undated) DEVICE — SPECIMEN CONTAINER,POSITIVE SEAL INDICATOR, OR PACKAGED: Brand: PRECISION

## (undated) DEVICE — MLPD DISPOSABLE PAD (6' ROLL) 3 ROLLS: Brand: SCHAERER MEDICAL USA

## (undated) DEVICE — SUTURE VICRYL 2-0 FSL

## (undated) DEVICE — UNIVERSAL STERIBUMP® STERILE (5/CASE): Brand: UNIVERSAL STERIBUMP®

## (undated) DEVICE — MARKER SKIN 2 TIP

## (undated) DEVICE — Device: Brand: STABLECUT®

## (undated) DEVICE — TRANSPOSAL ULTRAFLEX DUO/QUAD ULTRA CART MANIFOLD

## (undated) DEVICE — 2T11 #2 PDO 36 X 36: Brand: 2T11 #2 PDO 36 X 36

## (undated) DEVICE — ALCOHOL 70% 4 OZ

## (undated) DEVICE — STOCKINETTE HYDROMED 8X6

## (undated) DEVICE — SUTURE VICRYL 0 CT-1

## (undated) DEVICE — SUTURE VICRYL 2-0 MO-6

## (undated) DEVICE — SYRINGE 30ML LL TIP

## (undated) DEVICE — STERILE POLYISOPRENE POWDER-FREE SURGICAL GLOVES: Brand: PROTEXIS

## (undated) DEVICE — DRILL SRG OIL CRTDG MAESTRO

## (undated) DEVICE — WRAP COOLING KNEE W/ICE PILLOW

## (undated) DEVICE — GLOVE BIOGEL ORTHO SZ 8

## (undated) DEVICE — GAUZE SPONGES,12 PLY: Brand: CURITY

## (undated) DEVICE — GOWN,SIRUS,FABRIC-REINFORCED,X-LARGE: Brand: MEDLINE

## (undated) DEVICE — GOWN SURG AERO CHROME XXL

## (undated) DEVICE — KENDALL SCD EXPRESS SLEEVES, THIGH LENGTH, MEDIUM: Brand: KENDALL SCD

## (undated) DEVICE — DRAPE C-ARM UNIVERSAL

## (undated) DEVICE — PSI PSN PREF CR PIN GUIDE

## (undated) DEVICE — GAMMEX® PI HYBRID SIZE 7.5, STERILE POWDER-FREE SURGICAL GLOVE, POLYISOPRENE AND NEOPRENE BLEND: Brand: GAMMEX

## (undated) DEVICE — GLOVE SURG TRIUMPH SZ 71/2

## (undated) DEVICE — GLOVE BIOGEL M SURG SZ 8

## (undated) DEVICE — BOWL CEMENT MIX QUICK-VAC

## (undated) DEVICE — STERILE POLYISOPRENE POWDER-FREE SURGICAL GLOVES WITH EMOLLIENT COATING: Brand: PROTEXIS

## (undated) DEVICE — DERMABOND LIQUID ADHESIVE

## (undated) DEVICE — PREMIUM WET SKIN PREP TRAY: Brand: MEDLINE INDUSTRIES, INC.

## (undated) DEVICE — CATH IV 14G X 5-1/4 ANGIO

## (undated) DEVICE — 3.0MM PRECISION NEURO (MATCH HEAD)

## (undated) DEVICE — LAMINECTOMY CDS: Brand: MEDLINE INDUSTRIES, INC.

## (undated) DEVICE — PSI PSN PREF CR PIN GUIDE: Type: IMPLANTABLE DEVICE | Site: KNEE

## (undated) DEVICE — DRAPE,LAPAROTOMY,PCH,STERILE: Brand: MEDLINE

## (undated) DEVICE — SUTURE VICRYL 3-0 RB-1

## (undated) DEVICE — CHLORAPREP 26ML APPLICATOR

## (undated) DEVICE — FLOSEAL HEMOSTATIC MATRIX, 5ML: Brand: FLOSEAL HEMOSTATIC MATRIX

## (undated) NOTE — IP AVS SNAPSHOT
[address]            (For Outpatient Use Only) Initial Admit Date: 1/22/2020   Inpt/Obs Admit Date: Inpt: N/A / Obs: N/A   Discharge Date:    Hospital Acct:  [de-identified]   MRN: [de-identified]   CSN: [id number]   CEID: [id number]        E Subscriber ID:  Pt Rel to Subscriber:    Hospital Account Financial Class: Medicare Advantage    January 22, 2020

## (undated) NOTE — IP AVS SNAPSHOT
Patient Demographics     Address  [address] [place] [address] Phone  [phone] [facility])  [phone] (Mobile) *Preferred* E-mail Address  [email or web address]      Emergency Contact(s)     Name Relation Home Work Mobile    Saleem Belle ? Usually allowed after  2-3 weeks;  check with physician at first office visit. ? Do Not drive while taking narcotics or muscle relaxants. ? Adhere to sitting restrictions. Stairs  ?  Climb stairs as needed    Incision site care and dressing  Changes ? Chewing tobacco, nicotine gum or patches will also inhibit healing. Pain Management  ? Relaxation techniques  o A way to focus your attention other than on your pain.  Your brain makes endorphins that are a natural body chemical that can decrease valerie ? become short of breath  ? have chest pain  ? cough up blood  ? have unexplained anxiety with breathing        Follow-up Information     Jose Kaur PA-C On 2/7/2020.     Specialty:  Physician Assistant  Contact information:  [address] Where to Get Your Medications      Please  your prescriptions at the location directed by your doctor or nurse    Bring a paper prescription for each of these medications  cyclobenzaprine 10 MG Tabs  docusate sodium 100 MG Caps  Ondansetron HCl 8 MG who is here for follow-up for back and leg pain. He continues to have pain in his low back. He also has numbness/tingling in his anterior thighs. He also feels tingling down his legs past his knees sometimes.   He completed new imaging and is here to rev No notes of this type exist for this encounter. Physical Therapy Notes (last 72 hours) (Notes from 1/19/2020  7:07 PM through 1/22/2020  7:07 PM)    No notes of this type exist for this encounter.      Occupational Therapy Notes (last 72 hours) (Notes f Description:  Patient's Short Term Goal: \"to be able to walk w/o assistance\"    Interventions:   - participate in therapy as ordered  Call for assistance as needed  Use assistive devices as instructed  - See additional Care Plan goals for specific interv

## (undated) NOTE — ED AVS SNAPSHOT
Yayo Nair   MRN: [id number]    Department:  [facility] Emergency Department   Date of Visit:  1/24/2019           Disclosure     Insurance plans vary and the physician(s) referred by the ER may not be covered by your plan.  Please contact you tell this physician (or your personal doctor if your instructions are to return to your personal doctor) about any new or lasting problems. The primary care or specialist physician will see patients referred from the [facility] Emergency Department.  Raman Philip

## (undated) NOTE — IP AVS SNAPSHOT
[address]            (For Outpatient Use Only) Initial Admit Date: 3/27/2018   Inpt/Obs Admit Date: Inpt: 3/27/18 / Obs: N/A   Discharge Date:    Hospital Acct:  [de-identified]   MRN: [de-identified]   CSN: [id number]        ENCOUNTER  Patient Hospital Account Financial Class: Medicare Advantage    March 31, 2018

## (undated) NOTE — LETTER
[facility] [address], [address]  Consent for Procedure/Sedation    Date:     Time:       1. I authorize the performance upon Jed Melton the following:  Inferior vena cava filter implant.      2. I authorize Dr. Aurelia Ledbetter (and ting ________________________________    ___________________    Witness: _________________________      Date: ___________________    Printed: 2020   3:19 PM  Patient Name: Hajarhonda Maxi        : 1950       Medical Record #: [id number]

## (undated) NOTE — LETTER
[facility] Testing Department  Phone: [phone]  OUTSIDE TESTING RESULT REQUEST      TO: Dr. Salina Rosas Date: 12/3/20    FAX #: [phone]     IMPORTANT: FOR YOUR IMMEDIATE ATTENTION    Please FAX all test results listed be

## (undated) NOTE — LETTER
[facility] [address], [address]  Consent for Procedure/Sedation    Date:        Time:       1. I authorize the performance upon [employer] the following:  Removal of Inferior Vena Cava Filter     2.  I authorize Dr. Benito Mason ( ________________________________    ___________________    Witness: _________________________      Date: ___________________    Printed: 2020   3:42 PM  Patient Name: Funmilayo Andrea        : 1950       Medical Record #: [id number]

## (undated) NOTE — Clinical Note
Dr. Mikala Arana you please do selective nerve root blocks for [place] at left L3 and right L2 nerve roots? Thank you.

## (undated) NOTE — LETTER
12/10/19        RE: Shannon Brooks     : 1950    Dear Dr. Mynor Chandler,    This letter is to inform you that your patient is being scheduled for surgery with Dr. Kenya Salmeron on 2020 at [facility]. We have asked the patient to contact your offi

## (undated) NOTE — IP AVS SNAPSHOT
Patient Demographics     Address  [address] [place], [place] [address] Phone  [phone] [facility])  [phone] (Mobile) *Preferred* E-mail Address  [email or web address]      Emergency Contact(s)     Name Relation Home Work Mobile    Saleem Spouse 77 ? Usually allowed after four to six weeks. Discuss specific work activities with your surgeon. Restrictions  ? For knee replacement surgery, follow instructions provided by physical therapy.   ? Do NOT put a pillow under your knee as it may be more dif physician. ? Review anticoagulant education information sheet provided. Discomfort  ? Surgical discomfort is normal for one to two months. ? Have realistic goals and keep a positive outlook. ?  You may need pain medication regularly (every 4-6 hours) ? An enema or suppository may be needed if above measures do not work. Prevention of infection and promotion of healing  ? Good hand washing is important.  Everyone should wash their hands or use hand  as soon as they walk in your house-whether ? Red streaks on skin near incision. ? Temperature >100.4F.  ? Increased pain at incision not relieved by pain medication. Signs of blood clot  ? Pain, excessive tenderness, redness, or swelling in leg or calf (other than incision site).   (CALL SPECIAL  INSTRUCTIONS:                                   Follow-up Information     Allan Cat MD In 1 week.     Specialty:  Internal Medicine  Contact information:  [address] [facility]  [phone]             Sarah Commonly known as:  NEURONTIN  Next dose due:  3/31/18 at 10 pm      Take 300 mg by mouth nightly. HYDROcodone-acetaminophen  MG Tabs  Commonly known as:  Aaliyah Hay  Notes to patient:  Do not exceed 3,000 mg of acetaminophen in 24 hours.       Ta Flowsheet Row Most Recent Value   Patient Weight  95.5 kg (210 lb 8.6 oz)         Lab Results Last 24 Hours      BASIC METABOLIC PANEL (8) [[id number]] (Abnormal)  Resulted: 03/31/18 0601, Result status: Final result   Ordering provider:  Melanie Toscano, Calcium, Total 8.5 8.3 - 10.3 mg/dL — [facility]   Comment:           Total Calciums are not corrected for effects of low albumin. If needed, use the following correction formula.       Corrected Calcium Formula:      ((4.0 - Albumin) x 0.8 + Calcium    Not H&P - H&P Note      H&P signed by Monica Bragg MD at 3/26/2018 12:09 PM   Version 1 of 1    Author:  Monica Bragg MD Service:  Orthopedics Author Type:  Physician    Filed:  3/26/2018 12:09 PM Date of Service:  3/22/2018  2:23 PM Status:  Signed 1+ effusion, mild quad atrophy, good capillary refill. No redness, rashes. No palpable lymph nodes. NEUROLOGIC:  He is alert and oriented x3. Cranial nerves II through XII are intact.       PLAN:  The patient is scheduled for elective left total knee r early this AM.  IVF were initiated. Repeat creat 2.29 mg/dl at 11:20 AM today. He has had several bowel movements as well but does note that when he was in discomfort the UOP was frequent with somewhat small volumes. He has not been receiving NSAIDS. injection 3 mg, 3 mg, Intravenous, Q2H PRN  •  TiZANidine HCl (ZANAFLEX) tab 2 mg, 2 mg, Oral, TID PRN  •  TraMADol HCl (ULTRAM) tab 50 mg, 50 mg, Oral, Q12H  •  0.9%  NaCl infusion, , Intravenous, Continuous  •  apixaban (ELIQUIS) tab 2.5 mg, 2.5 mg, Oral HEENT: No scleral icterus, MMM  Neck: Supple, no KYLAH or thyromegaly  Cardiac: Regular rate and rhythm, S1, S2 normal, no murmur or rub  Lungs: Clear without wheezes, rales, rhonchi. Abdomen: Soft, non-tender.  Mildly distended, + bowel sounds, no palpabl No results for input(s): PGLU in the last 72 hours. Imaging:     Impression/Plan:    #1. ZIA/CKD 3- baseline creat has been 1.4 mg/dl or so likely due to HTN with ZIA in the setting of poor intake and the most likely cause is prerenal azotemia.   u/ Comment: THORACIC 10 AND THORACIC 11 LAMINECTOMIES AND                ALL INDICATED PROCEDURES  No date: OTHER Right      Comment: eye muscle repair  No date: OTHER SURGICAL HISTORY      Comment: Shoulder 2008 2003: OTHER SURGICAL HISTORY      Comment Skilled Therapy Provided:  Pt seen in room today, recd sitting up in bs chair, educated in goals for session. Pt performed therex per TKA protocol with cont improved technique/tolerance for therex with increased number of repetitions.   Pt gait training wi motion;Strengthening;Stoop training;Stair training;Transfer training;Balance training  Rehab Potential : Good    CURRENT GOALS   Goal #1     Patient is able to demonstrate supine - sit EOB @ level: supervision    Goal #2     Patient is able to demonstrate ALL INDICATED PROCEDURES  No date: OTHER Right      Comment: eye muscle repair  No date: OTHER SURGICAL HISTORY      Comment: Shoulder 2008 2003: OTHER SURGICAL HISTORY      Comment: left knee meniscus  No date: OTHER SURGICAL HISTORY      Co Skilled Therapy Provided: Pt to PT gym via bs amb with PT aide, educated in goals for session. Pt performed therex per TKA protocol with cont improved technique/tolerance for therex with increased number of repetitions.   Pt gait training with rw with emph fxal mobility and safety. Patient will benefit from continued inpatient physical therapy to address above issues so that patient may achieve highest functional mobility level. Discussed need for additional day with Og LANDIS.   Cont to recommend home with Presenting Problem: S/p Left TKA on 3/27/18[SP.2]    Problem List[SP.1]  Active Problems:    Primary osteoarthritis of left knee  Global 06/25/2018[SP.2]      Past Medical History[SP.1]   Past Medical History:   Diagnosis Date   • Back problem     spine st -   Moving to and from a bed to a chair (including a wheelchair)?: A Little   -   Need to walk in hospital room?: A Little   -   Climbing 3-5 steps with a railing?: A Little[SP. 2]       AM-PAC Score:[SP.1]  Raw Score: 21   PT Approx Degree of Impairment Sc Comments: Pt participated in group session, tolerance was good.  was present yes   is a wife and daughter    Knee ROM[SP.1]      L Knee Flexion (degrees): 60     L Knee Extension (degrees): 8    Patient End of Session: Up in chair;Needs met; With SP.2 Tanya De Souza, PT on 3/29/2018 12:32 PM               Physical Therapy Note signed by Kandi Mon PTA at 3/28/2018  3:25 PM  Version 1 of 1    Author:  Kandi Mon PTA Service:  Rehab Author Type:  Physical Therapy Assistant    Adal Dee ACTIVITY TOLERANCE  Room air    AM-PAC '6-Clicks' INPATIENT SHORT FORM - BASIC MOBILITY  How much difficulty does the patient currently have. ..[BR.1]  -   Turning over in bed (including adjusting bedclothes, sheets and blankets)?: None   -   Sitting down o PM: Pt attended PM group session for mobility training and exercises. Pt shows improved tolerance to exercises and able to tolerate increased repetitions with assist for set up and cues for technique.  Pt continues to benefit from gait training, ambulated w Form was completed and this patient is demonstrating a 21% degree of impairment in mobility. Research supports that patients with this level of impairment may benefit from [address] [place] [address] .           DISCHARGE RECOMMENDATIONS[BR.1]  PT Discharge Recommendations: Home w History related to current admission: Admitted for elective L TKA on 3/27. PMH includes T spine surgery, LLE DVT, HTN.  See detailed history below      Problem List  Active Problems:    Primary osteoarthritis of left knee  Global 06/25/2018    ZIA (acute ki -   Taking care of personal grooming such as brushing teeth?: None  -   Eating meals?: None    AM-PAC Score:  Score: 21  Approx Degree of Impairment: 32.79%  Standardized Score (AM-PAC Scale): 44.27  CMS Modifier (G-Code): [place]    FUNCTIONAL TRANSFER ASSESSM Number of Visits to Meet Established Goals: 3    Presenting Problem: L TKA    History related to current admission:   Admitted for elective L TKA on 3/27. PMH includes T spine surgery, LLE DVT, HTN.  See detailed history below         Problem List  Active P -   Putting on and taking off regular upper body clothing?: None  -   Taking care of personal grooming such as brushing teeth?: None  -   Eating meals?: None    AM-PAC Score:  Score: 21  Approx Degree of Impairment: 32.79%  Standardized Score (AM-PAC Scale Patient will perform toileting: with supervision     Functional Transfer Goals  Patient will perform all functional transfers:  with supervision[MM. 1]       Attribution Key    MM. 1 - Miguel Ángel Browne OT on 3/30/2018  2:35 PM               Occupational Th L Lower Extremity: Weight Bearing as Tolerated    PAIN ASSESSMENT  Ratin  Location:  (L knee)  Management Techniques: Relaxation;Repositioning     ACTIVITY TOLERANCE  Room air  No shortness of breath    ACTIVITIES OF DAILY LIVING ASSESSMENT  AM-PAC ‘6- mobility. Recommend DC to home with family when ready. OT Discharge Recommendations: Home (family assist)  OT Device Recommendations: TBD    PLAN  OT Treatment Plan: ADL training; Endurance training;Patient/Family education; Compensatory technique educat

## (undated) NOTE — LETTER
[facility] Testing Department  Phone: [phone]  OUTSIDE TESTING RESULT REQUEST      TO:   Dr Anastasia Padilla Date: 12/11/17    FAX #: [phone]     IMPORTANT: FOR YOUR IMMEDIATE ATTENTION  Please FAX all test results listed below to: 61

## (undated) NOTE — LETTER
12/10/19        RE: Reji Winslow     : 1950    Dear Dr. Dutch Oppenheim,     This letter is to inform you that your patient is being scheduled for surgery with Dr. Clair Ojeda on 2020 at [facility]. We have asked the patient to contact your offic

## (undated) NOTE — LETTER
[facility] Testing Department  Phone: [phone]  OUTSIDE TESTING RESULT REQUEST      TO:   Ronald Viveros  Today's Date: 3/1/18    FAX #: [phone]     IMPORTANT: FOR YOUR IMMEDIATE ATTENTION  Please FAX all test results listed below

## (undated) NOTE — LETTER
[facility] Testing Department  Phone: [phone]  Right Fax: [phone]  [facility] [address] Date: 20    Patient Name: Rj [facility]  Surgery Date: 12/15/2020    CSN: [id number]  Medical Record: [id number]   :

## (undated) NOTE — LETTER
[facility] Testing Department  Phone: [phone]  OUTSIDE TESTING RESULT REQUEST      TO:   Dr. Amari Baeza Date: 12/3/20    FAX #: [phone]     IMPORTANT: FOR YOUR IMMEDIATE ATTENTION    Please FAX all test results listed edith